# Patient Record
Sex: FEMALE | Race: BLACK OR AFRICAN AMERICAN | Employment: FULL TIME | URBAN - METROPOLITAN AREA
[De-identification: names, ages, dates, MRNs, and addresses within clinical notes are randomized per-mention and may not be internally consistent; named-entity substitution may affect disease eponyms.]

---

## 2018-11-13 ENCOUNTER — HOSPITAL ENCOUNTER (EMERGENCY)
Age: 53
Discharge: HOME | End: 2018-11-13
Attending: EMERGENCY MEDICINE
Payer: SELF-PAY

## 2018-11-13 VITALS
OXYGEN SATURATION: 99 % | WEIGHT: 158 LBS | HEART RATE: 76 BPM | RESPIRATION RATE: 16 BRPM | HEIGHT: 66 IN | BODY MASS INDEX: 25.39 KG/M2 | SYSTOLIC BLOOD PRESSURE: 148 MMHG | DIASTOLIC BLOOD PRESSURE: 84 MMHG | TEMPERATURE: 98.2 F

## 2018-11-13 DIAGNOSIS — R42 DIZZINESS: ICD-10-CM

## 2018-11-13 DIAGNOSIS — E87.8 ELECTROLYTE ABNORMALITY: Primary | ICD-10-CM

## 2018-11-13 LAB
ALBUMIN SERPL-MCNC: 2.8 G/DL (ref 3.5–5)
ALBUMIN/GLOB SERPL: 0.7 {RATIO}
ALP SERPL-CCNC: 180 U/L (ref 50–130)
ALT SERPL-CCNC: 22 U/L (ref 12–65)
ANION GAP SERPL CALC-SCNC: 10 MMOL/L
AST SERPL-CCNC: 13 U/L (ref 15–37)
BACTERIA URNS QL MICRO: 0 /HPF
BASOPHILS # BLD: 0 K/UL (ref 0–0.2)
BASOPHILS NFR BLD: 1 % (ref 0–2)
BILIRUB SERPL-MCNC: 0.4 MG/DL (ref 0.2–1.1)
BUN SERPL-MCNC: 21 MG/DL (ref 6–23)
CALCIUM SERPL-MCNC: 9 MG/DL (ref 8.3–10.4)
CASTS URNS QL MICRO: 0 /LPF
CHLORIDE SERPL-SCNC: 91 MMOL/L (ref 98–107)
CO2 SERPL-SCNC: 33 MMOL/L (ref 21–32)
CREAT SERPL-MCNC: 1.13 MG/DL (ref 0.6–1)
DIFFERENTIAL METHOD BLD: NORMAL
EOSINOPHIL # BLD: 0.1 K/UL (ref 0–0.8)
EOSINOPHIL NFR BLD: 1 % (ref 0.5–7.8)
EPI CELLS #/AREA URNS HPF: NORMAL /HPF
ERYTHROCYTE [DISTWIDTH] IN BLOOD BY AUTOMATED COUNT: 13 %
GLOBULIN SER CALC-MCNC: 4 G/DL (ref 2.3–3.5)
GLUCOSE SERPL-MCNC: 180 MG/DL (ref 65–100)
HCT VFR BLD AUTO: 40 % (ref 35.8–46.3)
HGB BLD-MCNC: 13 G/DL (ref 11.7–15.4)
IMM GRANULOCYTES # BLD: 0 K/UL (ref 0–0.5)
IMM GRANULOCYTES NFR BLD AUTO: 0 % (ref 0–5)
LYMPHOCYTES # BLD: 2.1 K/UL (ref 0.5–4.6)
LYMPHOCYTES NFR BLD: 25 % (ref 13–44)
MAGNESIUM SERPL-MCNC: 1.6 MG/DL (ref 1.8–2.4)
MCH RBC QN AUTO: 26.7 PG (ref 26.1–32.9)
MCHC RBC AUTO-ENTMCNC: 32.5 G/DL (ref 31.4–35)
MCV RBC AUTO: 82.3 FL (ref 79.6–97.8)
MONOCYTES # BLD: 0.6 K/UL (ref 0.1–1.3)
MONOCYTES NFR BLD: 7 % (ref 4–12)
NEUTS SEG # BLD: 5.7 K/UL (ref 1.7–8.2)
NEUTS SEG NFR BLD: 67 % (ref 43–78)
NRBC # BLD: 0 K/UL (ref 0–0.2)
PLATELET # BLD AUTO: 192 K/UL (ref 150–450)
PMV BLD AUTO: 10.1 FL (ref 9.4–12.3)
POTASSIUM SERPL-SCNC: 3.1 MMOL/L (ref 3.5–5.1)
PROT SERPL-MCNC: 6.8 G/DL
RBC # BLD AUTO: 4.86 M/UL (ref 4.05–5.2)
RBC #/AREA URNS HPF: NORMAL /HPF
SODIUM SERPL-SCNC: 134 MMOL/L (ref 136–145)
WBC # BLD AUTO: 8.5 K/UL (ref 4.3–11.1)
WBC URNS QL MICRO: NORMAL /HPF

## 2018-11-13 PROCEDURE — 81015 MICROSCOPIC EXAM OF URINE: CPT

## 2018-11-13 PROCEDURE — 74011250636 HC RX REV CODE- 250/636: Performed by: EMERGENCY MEDICINE

## 2018-11-13 PROCEDURE — 99284 EMERGENCY DEPT VISIT MOD MDM: CPT | Performed by: EMERGENCY MEDICINE

## 2018-11-13 PROCEDURE — 85025 COMPLETE CBC W/AUTO DIFF WBC: CPT

## 2018-11-13 PROCEDURE — 74011250637 HC RX REV CODE- 250/637: Performed by: EMERGENCY MEDICINE

## 2018-11-13 PROCEDURE — 96365 THER/PROPH/DIAG IV INF INIT: CPT | Performed by: EMERGENCY MEDICINE

## 2018-11-13 PROCEDURE — 83735 ASSAY OF MAGNESIUM: CPT

## 2018-11-13 PROCEDURE — 80053 COMPREHEN METABOLIC PANEL: CPT

## 2018-11-13 RX ORDER — POTASSIUM CHLORIDE 20 MEQ/1
40 TABLET, EXTENDED RELEASE ORAL
Status: COMPLETED | OUTPATIENT
Start: 2018-11-13 | End: 2018-11-13

## 2018-11-13 RX ORDER — MAGNESIUM SULFATE HEPTAHYDRATE 40 MG/ML
2 INJECTION, SOLUTION INTRAVENOUS
Status: COMPLETED | OUTPATIENT
Start: 2018-11-13 | End: 2018-11-13

## 2018-11-13 RX ORDER — POTASSIUM CHLORIDE 1500 MG/1
20 TABLET, FILM COATED, EXTENDED RELEASE ORAL 2 TIMES DAILY
Qty: 14 TAB | Refills: 0 | Status: SHIPPED | OUTPATIENT
Start: 2018-11-13 | End: 2018-11-20

## 2018-11-13 RX ADMIN — POTASSIUM CHLORIDE 40 MEQ: 20 TABLET, EXTENDED RELEASE ORAL at 15:27

## 2018-11-13 RX ADMIN — MAGNESIUM SULFATE HEPTAHYDRATE 2 G: 40 INJECTION, SOLUTION INTRAVENOUS at 15:27

## 2018-11-13 RX ADMIN — SODIUM CHLORIDE 1000 ML: 900 INJECTION, SOLUTION INTRAVENOUS at 14:48

## 2018-11-13 NOTE — ED TRIAGE NOTES
Pt in states she was sent by dr. Jayde Jon office and told to come to ed for low blood pressure. States her bgl was 400. States she uses insulin pump and dr Leatha Graf has made changes recently.

## 2018-11-13 NOTE — DISCHARGE INSTRUCTIONS
Electrolyte Imbalance: Care Instructions  Your Care Instructions  Electrolytes are minerals in your blood. They include sodium, potassium, calcium, and magnesium. When they are not at the right levels, you can feel very ill. You may not know what is causing it, but you know something is wrong. You may feel weak or numb, have muscle spasms, or twitch. Your heart may beat fast. Symptoms are different with each mineral. Too much is as bad as too little. Minerals help keep your body working as it should. Vomiting, diarrhea, and fever can cause you to lose minerals. A problem with your kidneys can tip a mineral out of balance. So can taking certain medicines. Your doctor may do more tests. He or she may change your medicine and diet. If you are low in one or more minerals, they may be given through a tube into your vein (IV). Your doctor may have you take or drink special fluids or pills. If your kidneys are failing, your blood may be filtered. This is called dialysis. It can put the minerals back in balance. Follow-up care is a key part of your treatment and safety. Be sure to make and go to all appointments, and call your doctor if you are having problems. It's also a good idea to know your test results and keep a list of the medicines you take. How can you care for yourself at home? · Take your medicines exactly as prescribed. Call your doctor if you have any problems with your medicines. You will get more details on the specific medicines your doctor prescribes. · Do not take any medicine without talking to your doctor first. This includes prescription, over-the-counter, and herbal medicines. · If you have kidney, heart, or liver disease and have to limit fluids, talk with your doctor before you increase the amount of fluids you drink. · Your doctor or dietitian may give you a diet plan to help balance your minerals. Follow the diet carefully. When should you call for help?   Call 911 anytime you think you may need emergency care. For example, call if:    · You passed out (lost consciousness).     · Your heartbeat seems to be irregular. It might be speeding up and then slowing down or skipping beats.    Call your doctor now or seek immediate medical care if:    · You have muscle aches.     · You feel very weak.     · You are confused or cannot think clearly.    Watch closely for changes in your health, and be sure to contact your doctor if:    · You do not get better as expected. Where can you learn more? Go to http://russell-piper.info/. Enter Y745 in the search box to learn more about \"Electrolyte Imbalance: Care Instructions. \"  Current as of: March 29, 2018  Content Version: 11.8  © 0535-1781 [employer]. Care instructions adapted under license by [employer] (which disclaims liability or warranty for this information). If you have questions about a medical condition or this instruction, always ask your healthcare professional. [place] [address] any warranty or liability for your use of this information. Dizziness: Care Instructions  Your Care Instructions  Dizziness is the feeling of unsteadiness or fuzziness in your head. It is different than having vertigo, which is a feeling that the room is spinning or that you are moving or falling. It is also different from lightheadedness, which is the feeling that you are about to faint. It can be hard to know what causes dizziness. Some people feel dizzy when they have migraine headaches. Sometimes bouts of flu can make you feel dizzy. Some medical conditions, such as heart problems or high blood pressure, can make you feel dizzy. Many medicines can cause dizziness, including medicines for high blood pressure, pain, or anxiety. If a medicine causes your symptoms, your doctor may recommend that you stop or change the medicine.  If it is a problem with your heart, you may need medicine to help your heart work better. If there is no clear reason for your symptoms, your doctor may suggest watching and waiting for a while to see if the dizziness goes away on its own. Follow-up care is a key part of your treatment and safety. Be sure to make and go to all appointments, and call your doctor if you are having problems. It's also a good idea to know your test results and keep a list of the medicines you take. How can you care for yourself at home? · If your doctor recommends or prescribes medicine, take it exactly as directed. Call your doctor if you think you are having a problem with your medicine. · Do not drive while you feel dizzy. · Try to prevent falls. Steps you can take include:  ? Using nonskid mats, adding grab bars near the tub, and using night-lights. ? Clearing your home so that walkways are free of anything you might trip on.  ? Letting family and friends know that you have been feeling dizzy. This will help them know how to help you. When should you call for help? Call 911 anytime you think you may need emergency care. For example, call if:    · You passed out (lost consciousness).     · You have dizziness along with symptoms of a heart attack. These may include:  ? Chest pain or pressure, or a strange feeling in the chest.  ? Sweating. ? Shortness of breath. ? Nausea or vomiting. ? Pain, pressure, or a strange feeling in the back, neck, jaw, or upper belly or in one or both shoulders or arms. ? Lightheadedness or sudden weakness. ? A fast or irregular heartbeat.     · You have symptoms of a stroke. These may include:  ? Sudden numbness, tingling, weakness, or loss of movement in your face, arm, or leg, especially on only one side of your body. ? Sudden vision changes. ? Sudden trouble speaking. ? Sudden confusion or trouble understanding simple statements. ? Sudden problems with walking or balance.   ? A sudden, severe headache that is different from past headaches.    Call your doctor now or seek immediate medical care if:    · You feel dizzy and have a fever, headache, or ringing in your ears.     · You have new or increased nausea and vomiting.     · Your dizziness does not go away or comes back.    Watch closely for changes in your health, and be sure to contact your doctor if:    · You do not get better as expected. Where can you learn more? Go to http://russell-piper.info/. Enter F158 in the search box to learn more about \"Dizziness: Care Instructions. \"  Current as of: November 20, 2017  Content Version: 11.8  © 5990-3643 [employer]. Care instructions adapted under license by [employer] (which disclaims liability or warranty for this information). If you have questions about a medical condition or this instruction, always ask your healthcare professional. [address] any warranty or liability for your use of this information.

## 2018-11-13 NOTE — ED NOTES
Patient states she has \"something moving through her body\". Recently lost weight due to diuretic use. She also complains of \"something in my feet\".

## 2018-11-13 NOTE — ED PROVIDER NOTES
Pt is a 44 yo female who is coming in with poorly controlled glucose. She went to pcp and they reported increase in her glucose. She has history of noncompliance with her insulin. She does admit she took off her v-go 30 2 days ago as she felt dizzy. I asked her what her sugar was at that time and she said in the 300's. She just put the device back on this morning. She also has had some diarrhea. She also complains that something is in her feet and moving. She also has some low BP as well it got down to 88. She currently has no complaint. The history is provided by the patient. Past Medical History:  
Diagnosis Date  Diabetes ([facility] [address] No past surgical history on file. No family history on file. Social History Socioeconomic History  Marital status: SINGLE Spouse name: Not on file  Number of children: Not on file  Years of education: Not on file  Highest education level: Not on file Social Needs  Financial resource strain: Not on file  Food insecurity - worry: Not on file  Food insecurity - inability: Not on file  Transportation needs - medical: Not on file  Transportation needs - non-medical: Not on file Occupational History  Not on file Tobacco Use  Smoking status: Current Every Day Smoker Packs/day: 1.00 Substance and Sexual Activity  Alcohol use: Yes Comment: occ  Drug use: No  
 Sexual activity: Not on file Other Topics Concern  Not on file Social History Narrative  Not on file ALLERGIES: Patient has no known allergies. Review of Systems Constitutional: Positive for chills. Negative for fever. Respiratory: Negative for chest tightness, shortness of breath and wheezing. Cardiovascular: Negative for chest pain and palpitations. Gastrointestinal: Positive for diarrhea and nausea. Negative for vomiting. Skin: Negative. Negative for rash and wound. All other systems reviewed and are negative. Vitals:  
 11/13/18 1338 BP: 120/82 Pulse: 78 Resp: 17 Temp: 98 °F (36.7 °C) SpO2: 100% Weight: 71.7 kg (158 lb) Height: 5' 6\" (1.676 m) Physical Exam  
Constitutional: She is oriented to person, place, and time. She appears well-developed and well-nourished. No distress. HENT:  
Head: Normocephalic and atraumatic. Eyes: Conjunctivae are normal. No scleral icterus. Neck: Normal range of motion. Cardiovascular: Normal rate, regular rhythm and normal heart sounds. Exam reveals no gallop and no friction rub. No murmur heard. Pulmonary/Chest: Effort normal. No stridor. No respiratory distress. She has no wheezes. She has no rales. Abdominal: Soft. She exhibits no distension and no mass. There is no tenderness. There is no guarding. Neurological: She is alert and oriented to person, place, and time. No cranial nerve deficit. Skin: Skin is warm. No rash noted. No erythema. Psychiatric: She has a normal mood and affect. Her behavior is normal.  
Nursing note and vitals reviewed. MDM Number of Diagnoses or Management Options Diagnosis management comments: Patient pressures are running much higher with IV fluids. She currently has a relatively normal sugar at 180. We had long discussion about her compliance with device. She has taken off several times due to concerns that it is causing her dizziness. But I think it may be more electrolyte imbalance and have placed these. She is comfortable trying the V-GO device a little longer and throat are only to take it off if her sugar was low otherwise she needs to wear it all the time as instructed by her PCP. Amount and/or Complexity of Data Reviewed Clinical lab tests: ordered and reviewed (Results for orders placed or performed during the hospital encounter of 11/13/18 
-CBC WITH AUTOMATED DIFF Result                      Value             Ref Range WBC                         8.5               4.3 - 11.1 K* 
     RBC                         4.86              4.05 - 5.2 M* HGB                         13.0              11.7 - 15.4 * HCT                         40.0              35.8 - 46.3 % MCV                         82.3              79.6 - 97.8 * MCH                         26.7              26.1 - 32.9 * MCHC                        32.5              31.4 - 35.0 * RDW                         13.0              % PLATELET                    192               150 - 450 K/* MPV                         10.1              9.4 - 12.3 FL ABSOLUTE NRBC               0.00              0.0 - 0.2 K/* DF                          AUTOMATED NEUTROPHILS                 67                43 - 78 % LYMPHOCYTES                 25                13 - 44 % MONOCYTES                   7                 4.0 - 12.0 % EOSINOPHILS                 1                 0.5 - 7.8 % BASOPHILS                   1                 0.0 - 2.0 % IMMATURE GRANULOCYTES       0                 0.0 - 5.0 %   
     ABS. NEUTROPHILS            5.7               1.7 - 8.2 K/* ABS. LYMPHOCYTES            2.1               0.5 - 4.6 K/* ABS. MONOCYTES              0.6               0.1 - 1.3 K/* ABS. EOSINOPHILS            0.1               0.0 - 0.8 K/* ABS. BASOPHILS              0.0               0.0 - 0.2 K/* ABS. IMM. GRANS.            0.0               0.0 - 0.5 K/* 
-METABOLIC PANEL, COMPREHENSIVE Result                      Value             Ref Range Sodium                      134 (L)           136 - 145 mm* Potassium                   3.1 (L)           3.5 - 5.1 mm* Chloride                    91 (L)            98 - 107 mmo*      CO2                         33 (H)            21 - 32 mmol* 
 Anion gap                   10                mmol/L Glucose                     180 (H)           65 - 100 mg/* BUN                         21                6 - 23 MG/DL Creatinine                  1.13 (H)          0.6 - 1.0 MG* 
     GFR est AA                  >60               >60 ml/min/1* GFR est non-AA              53                ml/min/1.73m2 Calcium                     9.0               8.3 - 10.4 M* Bilirubin, total            0.4               0.2 - 1.1 MG* ALT (SGPT)                  22                12 - 65 U/L   
     AST (SGOT)                  13 (L)            15 - 37 U/L Alk. phosphatase            180 (H)           50 - 130 U/L Protein, total              6.8               g/dL Albumin                     2.8 (L)           3.5 - 5.0 g/* Globulin                    4.0 (H)           2.3 - 3.5 g/* A-G Ratio                   0.7 -MAGNESIUM Result                      Value             Ref Range Magnesium                   1.6 (L)           1.8 - 2.4 mg* ) Independent visualization of images, tracings, or specimens: yes (Normal sinus rhythm, narrow QRS complex, no bundle branch blocks, and no ST segment elevation ) Procedures

## 2018-11-13 NOTE — ED NOTES
I have reviewed discharge instructions with the patient and spouse. The patient and spouse verbalized understanding. Patient left ED via Discharge Method: ambulatory to Home with her . Opportunity for questions and clarification provided. Patient given 1 scripts. To continue your aftercare when you leave the hospital, you may receive an automated call from our care team to check in on how you are doing. This is a free service and part of our promise to provide the best care and service to meet your aftercare needs.  If you have questions, or wish to unsubscribe from this service please call [phone]. Thank you for Choosing our [facility] Emergency Department.

## 2018-11-15 ENCOUNTER — HOSPITAL ENCOUNTER (OUTPATIENT)
Dept: PHYSICAL THERAPY | Age: 53
Discharge: HOME | End: 2018-11-15
Payer: COMMERCIAL

## 2018-11-15 PROCEDURE — 97162 PT EVAL MOD COMPLEX 30 MIN: CPT

## 2018-11-15 NOTE — THERAPY EVALUATION
Paolo Merline : 1965 Primary: [facility] Secondary:  [facility] [place] [id number] [facility], [address], [place] [address]. [phone]   [phone] OUTPATIENT PHYSICAL THERAPY:Initial Assessment 11/15/2018 ICD-10: Treatment Diagnosis: Other abnormalities of gait and mobility R26.89; muscle weakness (generalized) Precautions/Allergies:  
Patient has no known allergies. MD Orders: Impaired gait MEDICAL/REFERRING DIAGNOSIS: 
Unspecified abnormalities of gait and mobility [R26.9] DATE OF ONSET:  2 months. REFERRING PHYSICIAN: Yossi Garcia MD 
RETURN PHYSICIAN APPOINTMENT: 18 INITIAL ASSESSMENT:  Ms. Ftaoumata Jimenez presents with LE weakness, imbalance, unsteady gait. Patient is at risk for falls. . Patient would benefit from PT to address these problems to improve patient's independence and safety with mobility and daily activities. Thank you. PROBLEM LIST (Impacting functional limitations): 1. Decreased Strength 2. Decreased Ambulation Ability/Technique 3. Decreased Balance 4. Decreased Activity Tolerance 5. Decreased [place] with Home Exercise Program INTERVENTIONS PLANNED: 
1. Balance Exercise 2. Gait Training 3. Home Exercise Program (HEP) 4. Neuromuscular Re-education/Strengthening 5. Therapeutic Exercise/Strengthening TREATMENT PLAN: 
Effective Dates: 11/15/2018 TO 2019 (90 days). Frequency/Duration: 2 times a week for 60- 90 Day(s) GOALS: (Goals have been discussed and agreed upon with patient.) Short-Term Functional Goals: Time Frame: 2-4 weeks 1. Patient will demonstrate independence and compliance with home exercise program to improve balance and strength for daily activities. 2. Patient will increase her score on the Thomas Balance Scale to greater than or equal to 36/56 indicating improved safety and decreased fall risk for daily activities.    
3.  
 Discharge Goals: Time Frame: 8-12 weeks 1. Patient will increase bilateral lower extremity strength to greater than or equal to 4/5 to improve strength for functional mobility activities. 2. Patient will increase her score on the Thomas Balance Scale to greater than or equal to 48/56 indicating improved safety and decreased fall risk for daily activities. 3. Patient will ambulate with least assistive device over level and unlevel surfaces without evidence of imbalance to improve safety for daily activities. 4. Patient will demonstrate improved time on Five Time Sit to Stand Test to less than or equal to 21 seconds indicating improved LE strength for daily mobility. 5.  
Rehabilitation Potential For Stated Goals: Good Regarding Shameka Villafuerte's therapy, I certify that the treatment plan above will be carried out by a therapist or under their direction. Thank you for this referral, 
Piper Boyd PT Referring Physician Signature: Desi Moreno MD            Date The information in this section was collected on 11/15/18 (except where otherwise noted). HISTORY:  
History of Present Injury/Illness (Reason for Referral): Had problems with blood sugar and diabetes and legs got swollen for about a month and since then balance is off. Using cane since then. Have fallen. Dizzy at times. MD not sure what's going on yet. Past Medical History/Comorbidities: Ms. Jennifer Gar  has a past medical history of Diabetes ([facility] [place] [address] Ms. Jennifer Gar  has no past surgical history on file. Social History/Living Environment:  
  steps to enter home with rail. One level with basement. Prior Level of Function/Work/Activity: 
Works for [employer], out of work since this started. Dominant Side:  
      RIGHT Previous Treatment Approaches:   
      none Personal Factors:   
      Sex:  female Age:  45 y.o. Ambulatory/Rehab Services H2 Model Falls Risk Assessment Risk Factors: (1)  Dizziness/Vertigo 
     (5)  History of Recent Falls [w/in 3 months] Ability to Rise from Chair: 
     (1)  Pushes up, successful in one attempt Falls Prevention Plan: Mobility Assistance Device (specify):  cane Total: (5 or greater = High Risk): 7  
 ©2010 [facility] [employer] . [facility] States Patent #9,662,707. Federal Law prohibits the replication, distribution or use without written permission from [facility] [employer] Current Medications:   
  
Current Outpatient Medications:  
  potassium chloride SR (K-TAB) 20 mEq tablet, Take 1 Tab by mouth two (2) times a day for 7 days. , Disp: 14 Tab, Rfl: 0 
  HYDROcodone-acetaminophen (NORCO) 5-325 mg per tablet, Take 1 Tab by mouth every four (4) hours as needed for Pain., Disp: 6 Tab, Rfl: 0 
  HYDROcodone-acetaminophen (NORCO) 7.5-325 mg per tablet, Take 1 Tab by mouth every six (6) hours as needed for Pain., Disp: 14 Tab, Rfl: 0 
  amoxicillin-clavulanate (AUGMENTIN) 875-125 mg per tablet, Take 1 Tab by mouth two (2) times a day., Disp: 20 Tab, Rfl: 0 
  ibuprofen (MOTRIN IB) 200 mg tablet, Take  by mouth., Disp: , Rfl:  
  GLIPIZIDE PO, Take  by mouth.  , Disp: , Rfl:   METFORMIN HCL (METFORMIN PO), Take  by mouth.  , Disp: , Rfl:  
  acyclovir (ZOVIRAX) 400 mg tablet, Take 400 mg by mouth two (2) times a day.  , Disp: , Rfl:   
Date Last Reviewed:  11/15/18 Number of Personal Factors/Comorbidities that affect the Plan of Care: 1-2: MODERATE COMPLEXITY EXAMINATION:  
Observation/Orthostatic Postural Assessment:   
      WNL Strength:   
      LE STRENGTH:  3+/5 hip flexion, 4-/5 hip abduction, 4-/5 hip adduction, 4-/5 hip extension, 4-/5 knee extension, 4-/5 knee flexion, 4-/5 ankle dorsiflexion, 4-/5 ankle plantarflexion, 4-/5 ankle inversion, 4-/5 ankle eversion. Functional Mobility:  
      Gait/Ambulation:  Patient ambulates with cane with normal gait pattern, but wider MERA, and path deviations. Transfers:  With UE assist 
      Bed Mobility:  independent Stairs:  With rail and assist 
Sensation: Altered sensation in feet legs (\"ball on bottom of feet and tight on calves L> R\") Postural Control & Balance:· Thomas Balance Scale:  33/56.   (A score less than 45/56 indicates high risk of falls) · Dynamic Gait Index:  NT/24.   (A score less than or equal to19/24 is abnormal and predictive of falls) Body Structures Involved: 1. Nerves 2. Eyes and Ears 3. Muscles Body Functions Affected: 1. Sensory/Pain 2. Neuromusculoskeletal 
3. Movement Related Activities and Participation Affected: 1. General Tasks and Demands 2. Mobility 3. Domestic Life 4. Community, Social and [place] Number of elements (examined above) that affect the Plan of Care: 3: MODERATE COMPLEXITY CLINICAL PRESENTATION:  
Presentation: Evolving clinical presentation with changing clinical characteristics: MODERATE COMPLEXITY CLINICAL DECISION MAKING:  
Outcome Measure: Tool Used: Janis Enrique Balance Scale Score:  Initial: 33/56 Most Recent: X/56 (Date: -- ) Interpretation of Score: Each section is scored on a 0-4 scale, 0 representing the patients inability to perform the task and 4 representing independence. The scores of each section are added together for a total score of 56. The higher the patients score, the more independent the patient is. Any score below 45 indicates increased risk for falls. Score 56 55-45 44-34 33-23 22-12 11-1 0 Modifier CH CI CJ CK CL CM CN Tool Used: Five Times Sit to Stand (FTSST or 5xSST) Score:  Initial: 34 seconds Most Recent:  seconds (Date: -- ) Interpretation of Score: This is a measure of functional lower limb muscle strength and quantifying functional change of transitional movements.   A score of 12 seconds or less indicates a normal level of function for community dwelling older individuals, a score of 15 seconds or more is at risk for fall. Score 12 or less 13-14 15-16 17-18 19-20 20-21 22 or greater Modifier CH CI CJ CK CL CM CN Medical Necessity:  
· Patient is expected to demonstrate progress in strength, balance and functional technique to increase independence with daily activities. Reason for Services/Other Comments: 
· Patient continues to demonstrate capacity to improve strength, balance, gait which will increase independence and increase safety. Use of outcome tool(s) and clinical judgement create a POC that gives a: Questionable prediction of patient's progress: MODERATE COMPLEXITY  
  
 
 
 
TREATMENT:  
(In addition to Assessment/Re-Assessment sessions the following treatments were rendered) Pre-treatment Symptoms/Complaints:  Weak, off balance Pain: Initial:  
  0 Post Session:  0  
 
THERAPEUTIC EXERCISE: ( minutes):  Exercises per grid below to improve mobility, strength and balance. Required minimal verbal cues to promote proper body alignment, promote proper body posture and promote proper body mechanics. Progressed resistance, repetitions and complexity of movement as indicated. NEUROMUSCULAR RE-EDUCATION: ( minutes):  Exercise/activities per grid below to improve balance, kinesthetic sense, posture and proprioception. Required minimal verbal cues to promote static and dynamic balance in standing. [employer] 
Treatment/Session Assessment:   
· Response to Treatment:  Patient tolerated session well. Patient has standing LE exercises for HEP. Pierre Butler · Compliance with Program/Exercises: Will assess as treatment progresses. · Recommendations/Intent for next treatment session: \"Next visit will focus on advancements to more challenging activities\". Total Treatment Duration: PT Patient Time In/Time Out Time In: 1113 Time Out: 1200 Sofía Saba, PT No future appointments.

## 2018-11-28 ENCOUNTER — HOSPITAL ENCOUNTER (OUTPATIENT)
Dept: PHYSICAL THERAPY | Age: 53
Discharge: HOME | End: 2018-11-28
Payer: COMMERCIAL

## 2018-11-28 NOTE — PROGRESS NOTES
PT NOTE: Patient cancelled her appointment with PT this afternoon. She stated she was leaving an MD appointment and they were admitting her to the hospital.  Patient said she would call and follow up with PT about future appointments.   
Antonio Mcardle, PT

## 2018-11-30 ENCOUNTER — HOSPITAL ENCOUNTER (INPATIENT)
Age: 53
LOS: 3 days | Discharge: HOME | DRG: 312 | End: 2018-12-06
Attending: STUDENT IN AN ORGANIZED HEALTH CARE EDUCATION/TRAINING PROGRAM | Admitting: HOSPITALIST
Payer: COMMERCIAL

## 2018-11-30 DIAGNOSIS — N39.0 URINARY TRACT INFECTION WITH HEMATURIA, SITE UNSPECIFIED: ICD-10-CM

## 2018-11-30 DIAGNOSIS — R31.9 URINARY TRACT INFECTION WITH HEMATURIA, SITE UNSPECIFIED: ICD-10-CM

## 2018-11-30 DIAGNOSIS — R55 SYNCOPE AND COLLAPSE: ICD-10-CM

## 2018-11-30 DIAGNOSIS — R73.9 HYPERGLYCEMIA: Primary | ICD-10-CM

## 2018-11-30 PROCEDURE — 93005 ELECTROCARDIOGRAM TRACING: CPT | Performed by: EMERGENCY MEDICINE

## 2018-11-30 PROCEDURE — 87086 URINE CULTURE/COLONY COUNT: CPT

## 2018-11-30 PROCEDURE — 85025 COMPLETE CBC W/AUTO DIFF WBC: CPT

## 2018-11-30 PROCEDURE — 99285 EMERGENCY DEPT VISIT HI MDM: CPT | Performed by: STUDENT IN AN ORGANIZED HEALTH CARE EDUCATION/TRAINING PROGRAM

## 2018-11-30 PROCEDURE — 87186 SC STD MICRODIL/AGAR DIL: CPT

## 2018-11-30 PROCEDURE — 87088 URINE BACTERIA CULTURE: CPT

## 2018-11-30 PROCEDURE — 81015 MICROSCOPIC EXAM OF URINE: CPT

## 2018-11-30 PROCEDURE — 80053 COMPREHEN METABOLIC PANEL: CPT

## 2018-11-30 RX ORDER — LISINOPRIL 20 MG/1
20 TABLET ORAL DAILY
COMMUNITY
End: 2018-12-06

## 2018-12-01 ENCOUNTER — APPOINTMENT (OUTPATIENT)
Dept: GENERAL RADIOLOGY | Age: 53
DRG: 312 | End: 2018-12-01
Attending: STUDENT IN AN ORGANIZED HEALTH CARE EDUCATION/TRAINING PROGRAM
Payer: COMMERCIAL

## 2018-12-01 ENCOUNTER — APPOINTMENT (OUTPATIENT)
Dept: CT IMAGING | Age: 53
DRG: 312 | End: 2018-12-01
Attending: STUDENT IN AN ORGANIZED HEALTH CARE EDUCATION/TRAINING PROGRAM
Payer: COMMERCIAL

## 2018-12-01 PROBLEM — E11.65 UNCONTROLLED TYPE 2 DIABETES MELLITUS WITH HYPERGLYCEMIA (HCC): Chronic | Status: ACTIVE | Noted: 2018-12-01

## 2018-12-01 PROBLEM — I95.1 ORTHOSTATIC HYPOTENSION: Status: ACTIVE | Noted: 2018-12-01

## 2018-12-01 LAB
ALBUMIN SERPL-MCNC: 3.1 G/DL (ref 3.5–5)
ALBUMIN/GLOB SERPL: 0.8 {RATIO}
ALP SERPL-CCNC: 181 U/L (ref 50–136)
ALT SERPL-CCNC: 17 U/L (ref 12–65)
ANION GAP SERPL CALC-SCNC: 9 MMOL/L
AST SERPL-CCNC: 11 U/L (ref 15–37)
ATRIAL RATE: 89 BPM
BACTERIA URNS QL MICRO: ABNORMAL /HPF
BASOPHILS # BLD: 0 K/UL (ref 0–0.2)
BASOPHILS NFR BLD: 0 % (ref 0–2)
BILIRUB SERPL-MCNC: 0.5 MG/DL (ref 0.2–1.1)
BUN SERPL-MCNC: 20 MG/DL (ref 6–23)
CALCIUM SERPL-MCNC: 9 MG/DL (ref 8.3–10.4)
CALCULATED P AXIS, ECG09: 72 DEGREES
CALCULATED R AXIS, ECG10: 65 DEGREES
CALCULATED T AXIS, ECG11: 76 DEGREES
CASTS URNS QL MICRO: 0 /LPF
CHLORIDE SERPL-SCNC: 87 MMOL/L (ref 98–107)
CO2 SERPL-SCNC: 31 MMOL/L (ref 21–32)
CREAT SERPL-MCNC: 1.38 MG/DL (ref 0.6–1)
CRYSTALS URNS QL MICRO: 0 /LPF
DIAGNOSIS, 93000: NORMAL
DIFFERENTIAL METHOD BLD: ABNORMAL
EOSINOPHIL # BLD: 0 K/UL (ref 0–0.8)
EOSINOPHIL NFR BLD: 0 % (ref 0.5–7.8)
EPI CELLS #/AREA URNS HPF: ABNORMAL /HPF
ERYTHROCYTE [DISTWIDTH] IN BLOOD BY AUTOMATED COUNT: 12.9 % (ref 11.9–14.6)
EST. AVERAGE GLUCOSE BLD GHB EST-MCNC: 355 MG/DL
GLOBULIN SER CALC-MCNC: 3.9 G/DL (ref 2.3–3.5)
GLUCOSE BLD STRIP.AUTO-MCNC: 218 MG/DL (ref 65–100)
GLUCOSE BLD STRIP.AUTO-MCNC: 262 MG/DL (ref 65–100)
GLUCOSE BLD STRIP.AUTO-MCNC: 286 MG/DL (ref 65–100)
GLUCOSE BLD STRIP.AUTO-MCNC: 313 MG/DL (ref 65–100)
GLUCOSE BLD STRIP.AUTO-MCNC: 350 MG/DL (ref 65–100)
GLUCOSE BLD STRIP.AUTO-MCNC: 357 MG/DL (ref 65–100)
GLUCOSE SERPL-MCNC: 539 MG/DL (ref 65–100)
HBA1C MFR BLD: 14 %
HCT VFR BLD AUTO: 37.6 % (ref 35.8–46.3)
HGB BLD-MCNC: 12.5 G/DL (ref 11.7–15.4)
IMM GRANULOCYTES # BLD: 0 K/UL (ref 0–0.5)
IMM GRANULOCYTES NFR BLD AUTO: 0 % (ref 0–5)
LACTATE BLD-SCNC: 1.63 MMOL/L (ref 0.5–1.9)
LYMPHOCYTES # BLD: 1.4 K/UL (ref 0.5–4.6)
LYMPHOCYTES NFR BLD: 14 % (ref 13–44)
MCH RBC QN AUTO: 26.8 PG (ref 26.1–32.9)
MCHC RBC AUTO-ENTMCNC: 33.2 G/DL (ref 31.4–35)
MCV RBC AUTO: 80.7 FL (ref 79.6–97.8)
MONOCYTES # BLD: 0.7 K/UL (ref 0.1–1.3)
MONOCYTES NFR BLD: 6 % (ref 4–12)
MUCOUS THREADS URNS QL MICRO: 0 /LPF
NEUTS SEG # BLD: 8.4 K/UL (ref 1.7–8.2)
NEUTS SEG NFR BLD: 79 % (ref 43–78)
NRBC # BLD: 0 K/UL (ref 0–0.2)
P-R INTERVAL, ECG05: 146 MS
PLATELET # BLD AUTO: 189 K/UL (ref 150–450)
PMV BLD AUTO: 11.3 FL (ref 9.4–12.3)
POTASSIUM SERPL-SCNC: 3.4 MMOL/L (ref 3.5–5.1)
PROT SERPL-MCNC: 7 G/DL
Q-T INTERVAL, ECG07: 376 MS
QRS DURATION, ECG06: 76 MS
QTC CALCULATION (BEZET), ECG08: 457 MS
RBC # BLD AUTO: 4.66 M/UL (ref 4.05–5.2)
RBC #/AREA URNS HPF: ABNORMAL /HPF
SODIUM SERPL-SCNC: 127 MMOL/L (ref 136–145)
VENTRICULAR RATE, ECG03: 89 BPM
WBC # BLD AUTO: 10.6 K/UL (ref 4.3–11.1)
WBC URNS QL MICRO: >100 /HPF

## 2018-12-01 PROCEDURE — 96375 TX/PRO/DX INJ NEW DRUG ADDON: CPT | Performed by: STUDENT IN AN ORGANIZED HEALTH CARE EDUCATION/TRAINING PROGRAM

## 2018-12-01 PROCEDURE — 74011636637 HC RX REV CODE- 636/637: Performed by: FAMILY MEDICINE

## 2018-12-01 PROCEDURE — 74011636637 HC RX REV CODE- 636/637: Performed by: INTERNAL MEDICINE

## 2018-12-01 PROCEDURE — 74011636637 HC RX REV CODE- 636/637: Performed by: STUDENT IN AN ORGANIZED HEALTH CARE EDUCATION/TRAINING PROGRAM

## 2018-12-01 PROCEDURE — 77030020263 HC SOL INJ SOD CL0.9% LFCR 1000ML

## 2018-12-01 PROCEDURE — 99218 HC RM OBSERVATION: CPT

## 2018-12-01 PROCEDURE — 36415 COLL VENOUS BLD VENIPUNCTURE: CPT

## 2018-12-01 PROCEDURE — 74011250636 HC RX REV CODE- 250/636: Performed by: FAMILY MEDICINE

## 2018-12-01 PROCEDURE — 96365 THER/PROPH/DIAG IV INF INIT: CPT | Performed by: STUDENT IN AN ORGANIZED HEALTH CARE EDUCATION/TRAINING PROGRAM

## 2018-12-01 PROCEDURE — 71046 X-RAY EXAM CHEST 2 VIEWS: CPT

## 2018-12-01 PROCEDURE — 74011250636 HC RX REV CODE- 250/636: Performed by: INTERNAL MEDICINE

## 2018-12-01 PROCEDURE — 83605 ASSAY OF LACTIC ACID: CPT

## 2018-12-01 PROCEDURE — 74011000258 HC RX REV CODE- 258: Performed by: STUDENT IN AN ORGANIZED HEALTH CARE EDUCATION/TRAINING PROGRAM

## 2018-12-01 PROCEDURE — 74011250637 HC RX REV CODE- 250/637: Performed by: FAMILY MEDICINE

## 2018-12-01 PROCEDURE — 82962 GLUCOSE BLOOD TEST: CPT

## 2018-12-01 PROCEDURE — 74011250636 HC RX REV CODE- 250/636: Performed by: STUDENT IN AN ORGANIZED HEALTH CARE EDUCATION/TRAINING PROGRAM

## 2018-12-01 PROCEDURE — 83036 HEMOGLOBIN GLYCOSYLATED A1C: CPT

## 2018-12-01 PROCEDURE — 70450 CT HEAD/BRAIN W/O DYE: CPT

## 2018-12-01 RX ORDER — NALOXONE HYDROCHLORIDE 0.4 MG/ML
0.4 INJECTION, SOLUTION INTRAMUSCULAR; INTRAVENOUS; SUBCUTANEOUS AS NEEDED
Status: DISCONTINUED | OUTPATIENT
Start: 2018-12-01 | End: 2018-12-06 | Stop reason: HOSPADM

## 2018-12-01 RX ORDER — ONDANSETRON 2 MG/ML
4 INJECTION INTRAMUSCULAR; INTRAVENOUS
Status: DISCONTINUED | OUTPATIENT
Start: 2018-12-01 | End: 2018-12-06 | Stop reason: HOSPADM

## 2018-12-01 RX ORDER — GLIPIZIDE 5 MG/1
5 TABLET ORAL 2 TIMES DAILY
Qty: 60 TAB | Refills: 1 | Status: SHIPPED | OUTPATIENT
Start: 2018-12-01 | End: 2018-12-06 | Stop reason: SDUPTHER

## 2018-12-01 RX ORDER — CEPHALEXIN 500 MG/1
500 CAPSULE ORAL 4 TIMES DAILY
Qty: 28 CAP | Refills: 0 | Status: SHIPPED | OUTPATIENT
Start: 2018-12-01 | End: 2018-12-06

## 2018-12-01 RX ORDER — DEXTROSE 40 %
15 GEL (GRAM) ORAL AS NEEDED
Status: DISCONTINUED | OUTPATIENT
Start: 2018-12-01 | End: 2018-12-06 | Stop reason: HOSPADM

## 2018-12-01 RX ORDER — SODIUM CHLORIDE 0.9 % (FLUSH) 0.9 %
5-10 SYRINGE (ML) INJECTION AS NEEDED
Status: DISCONTINUED | OUTPATIENT
Start: 2018-12-01 | End: 2018-12-06 | Stop reason: HOSPADM

## 2018-12-01 RX ORDER — HYDROCODONE BITARTRATE AND ACETAMINOPHEN 10; 325 MG/1; MG/1
1 TABLET ORAL
Status: DISCONTINUED | OUTPATIENT
Start: 2018-12-01 | End: 2018-12-06 | Stop reason: HOSPADM

## 2018-12-01 RX ORDER — BISACODYL 5 MG
5 TABLET, DELAYED RELEASE (ENTERIC COATED) ORAL DAILY PRN
Status: DISCONTINUED | OUTPATIENT
Start: 2018-12-01 | End: 2018-12-06 | Stop reason: HOSPADM

## 2018-12-01 RX ORDER — INSULIN GLARGINE 100 [IU]/ML
20 INJECTION, SOLUTION SUBCUTANEOUS
Status: DISCONTINUED | OUTPATIENT
Start: 2018-12-01 | End: 2018-12-02

## 2018-12-01 RX ORDER — DIPHENHYDRAMINE HCL 25 MG
25 CAPSULE ORAL
Status: DISCONTINUED | OUTPATIENT
Start: 2018-12-01 | End: 2018-12-06 | Stop reason: HOSPADM

## 2018-12-01 RX ORDER — LORAZEPAM 1 MG/1
1 TABLET ORAL
Status: DISCONTINUED | OUTPATIENT
Start: 2018-12-01 | End: 2018-12-06 | Stop reason: HOSPADM

## 2018-12-01 RX ORDER — ENOXAPARIN SODIUM 100 MG/ML
40 INJECTION SUBCUTANEOUS EVERY 24 HOURS
Status: DISCONTINUED | OUTPATIENT
Start: 2018-12-01 | End: 2018-12-06 | Stop reason: HOSPADM

## 2018-12-01 RX ORDER — ACETAMINOPHEN 325 MG/1
650 TABLET ORAL
Status: DISCONTINUED | OUTPATIENT
Start: 2018-12-01 | End: 2018-12-06 | Stop reason: HOSPADM

## 2018-12-01 RX ORDER — SODIUM CHLORIDE 9 MG/ML
1000 INJECTION, SOLUTION INTRAVENOUS CONTINUOUS
Status: DISPENSED | OUTPATIENT
Start: 2018-12-01 | End: 2018-12-01

## 2018-12-01 RX ORDER — SODIUM CHLORIDE 9 MG/ML
1000 INJECTION, SOLUTION INTRAVENOUS CONTINUOUS
Status: DISCONTINUED | OUTPATIENT
Start: 2018-12-01 | End: 2018-12-02 | Stop reason: ALTCHOICE

## 2018-12-01 RX ORDER — GLIPIZIDE 5 MG/1
5 TABLET ORAL
Status: DISCONTINUED | OUTPATIENT
Start: 2018-12-01 | End: 2018-12-03

## 2018-12-01 RX ORDER — SODIUM CHLORIDE 0.9 % (FLUSH) 0.9 %
5-10 SYRINGE (ML) INJECTION EVERY 8 HOURS
Status: DISCONTINUED | OUTPATIENT
Start: 2018-12-01 | End: 2018-12-06 | Stop reason: HOSPADM

## 2018-12-01 RX ORDER — DEXTROSE 50 % IN WATER (D50W) INTRAVENOUS SYRINGE
25-50 AS NEEDED
Status: DISCONTINUED | OUTPATIENT
Start: 2018-12-01 | End: 2018-12-06 | Stop reason: HOSPADM

## 2018-12-01 RX ADMIN — GLIPIZIDE 5 MG: 5 TABLET ORAL at 16:14

## 2018-12-01 RX ADMIN — INSULIN HUMAN 10 UNITS: 100 INJECTION, SOLUTION PARENTERAL at 00:25

## 2018-12-01 RX ADMIN — CEFTRIAXONE 1 G: 1 INJECTION, POWDER, FOR SOLUTION INTRAMUSCULAR; INTRAVENOUS at 00:35

## 2018-12-01 RX ADMIN — SODIUM CHLORIDE 1000 ML: 900 INJECTION, SOLUTION INTRAVENOUS at 06:34

## 2018-12-01 RX ADMIN — INSULIN HUMAN 10 UNITS: 100 INJECTION, SOLUTION PARENTERAL at 22:11

## 2018-12-01 RX ADMIN — ENOXAPARIN SODIUM 40 MG: 40 INJECTION, SOLUTION INTRAVENOUS; SUBCUTANEOUS at 09:13

## 2018-12-01 RX ADMIN — SODIUM CHLORIDE 1000 ML: 900 INJECTION, SOLUTION INTRAVENOUS at 22:03

## 2018-12-01 RX ADMIN — INSULIN HUMAN 8 UNITS: 100 INJECTION, SOLUTION PARENTERAL at 12:12

## 2018-12-01 RX ADMIN — SODIUM CHLORIDE 1000 ML: 900 INJECTION, SOLUTION INTRAVENOUS at 03:33

## 2018-12-01 RX ADMIN — INSULIN GLARGINE 20 UNITS: 100 INJECTION, SOLUTION SUBCUTANEOUS at 22:00

## 2018-12-01 RX ADMIN — INSULIN HUMAN 6 UNITS: 100 INJECTION, SOLUTION PARENTERAL at 09:12

## 2018-12-01 RX ADMIN — GLIPIZIDE 5 MG: 5 TABLET ORAL at 09:13

## 2018-12-01 RX ADMIN — SODIUM CHLORIDE 1000 ML: 900 INJECTION, SOLUTION INTRAVENOUS at 00:25

## 2018-12-01 NOTE — PROGRESS NOTES
Condition remains stable given insulin coverage for an elevated glucose level twice, no acute distress, alert, oriented  X 4.

## 2018-12-01 NOTE — ED NOTES
While checking orthostatic vitals on pt, pt very adamant about md finding a way to keep her as \"I'm not well and can't go home, I'll die there. \" \"I need to stay and get well\". Notified. Md. Pt now on commode.

## 2018-12-01 NOTE — PROGRESS NOTES
Pt admitted to [facility] 3rd floor. Admission database completed. Admission booklet given and reviewed with patient. Pt oriented to room and bed controls. Pt instructed to use call light for any needs, pt voiced understanding.

## 2018-12-01 NOTE — ED NOTES
TRANSFER - OUT REPORT: 
 
Verbal report given to Devin Aldridge RN on Tru Daniel  being transferred to [phone] for routine progression of care Report consisted of patients Situation, Background, Assessment and  
Recommendations(SBAR). Information from the following report(s) ED Summary was reviewed with the receiving nurse. Lines:  
Peripheral IV 11/30/18 Right Antecubital (Active) Site Assessment Clean, dry, & intact 11/30/2018 11:36 PM  
Phlebitis Assessment 0 11/30/2018 11:36 PM  
Infiltration Assessment 0 11/30/2018 11:36 PM  
Dressing Status Clean, dry, & intact 11/30/2018 11:36 PM  
Dressing Type Transparent 11/30/2018 11:36 PM  
Hub Color/Line Status Pink 11/30/2018 11:36 PM  
Action Taken Blood drawn 11/30/2018 11:36 PM  
  
 
Opportunity for questions and clarification was provided. Patient transported with: 
 Registered Nurse

## 2018-12-01 NOTE — ED TRIAGE NOTES
Pt complains of weakness, dizziness, and low blood pressure when standing \"for weeks\". Pt reports passing out and falling, hitting her head on the bathtub. Pt denies taking blood thinners. Complains of pain to the left side of her body. Reports she has seen her PCP for this but [facility] sent me here. He won't even renew my diabetes prescriptions. \"

## 2018-12-01 NOTE — PROGRESS NOTES
The patient is a 46 y/o F with HTN, DM 2, was admitted early this morning for syncope likely due to orthostatic hypotension, hyperglycemia in the context of uncontrolled DM type 2 and asymptomatic bacteriuria. HbA1c came back at 14.0. She was started on Glipizide, and Insulin Glargine was added. She is also on sliding scale insulin. She will definitely need Diabetic education. Will continue IV fluids.

## 2018-12-01 NOTE — H&P
HOSPITALIST H&PNAME:  Daisy Rivera Age:  45 y.o. 
:   1965 MRN:   [id number] PCP: Dereck Ocampo MD 
Treatment Team: Attending Provider: Brooke Child; Primary Nurse: Margarita Rivera RN No Order CC: Reason for admission is: orthostatic hypotension HPI:  
Patient history was obtained from the ER provider prior to seeing the patient. Patient is a 45 y.o. female who presents to the ER due to several syncopal episodes and generalized weakness. she also reports a h/o insulin dependent DM, but was taken off insulin; and is currently not taking anything. She reports symptoms for the last month. In the ER, she was found to be orthostatic and this has persisted despite IVF and we are asked to admit/observation for continued IVF and monitoring. ROS: 
All systems have been reviewed and are negative except as stated in HPI or elsewhere. Past Medical History:  
Diagnosis Date  Diabetes ([address] History reviewed. No pertinent surgical history. Social History Tobacco Use  Smoking status: Former Smoker Packs/day: 1.00  Smokeless tobacco: Never Used Substance Use Topics  Alcohol use: Yes Comment: occ History reviewed. No pertinent family history. FH Reviewed and non-contributory to admitting diagnosis No Known Allergies Prior to Admission Medications Prescriptions Last Dose Informant Patient Reported? Taking?  
ibuprofen (MOTRIN IB) 200 mg tablet   Yes No  
Sig: Take  by mouth.  
lisinopril (PRINIVIL, ZESTRIL) 20 mg tablet   Yes Yes Sig: Take 20 mg by mouth daily. Facility-Administered Medications: None Objective:  
 
Patient Vitals for the past 24 hrs: 
 Temp Pulse Resp BP SpO2  
18 0507    95/61   
18 0506    117/65   
18 0504    126/69 99 % 18 0500    126/69 99 % 18 0340    153/80 98 % 18 0322    91/55 100 % 18 0320  90 17 142/83 100 % 18 0319    142/83 100 % 18 2310 97.6 °F (36.4 °C) 93 18 (!) 141/92 100 % No intake or output data in the 24 hours ending 18 0529 Temp (24hrs), Av.6 °F (36.4 °C), Min:97.6 °F (36.4 °C), Max:97.6 °F (36.4 °C) Oxygen Therapy O2 Sat (%): 99 % (18 050) Pulse via Oximetry: 84 beats per minute (18 050) O2 Device: Room air (18) Body mass index is 25.5 kg/m². Physical Exam: 
 
General:    WD and WN, No apparent distress. Head:   Normocephalic, without obvious abnormality, atraumatic. Eyes:  PERRL; EOMI; sclera normal/non-icteric ENT:  Hearing is normal.  Oropharynx is clear with tacky mucous membranes Resp:    Clear to auscultation bilaterally. No Wheezing or Rhonchi. Resp are even and unlabored Heart[de-identified]  Regular rate and rhythm,  no murmur,   No LE edema Abdomen:   Soft, non-tender. Not distended. Bowel sounds normal.  hepato-splenomegaly -none Musc/SK: Muscle strength is good and tone normal; No cyanosis. No clubbing Skin:     Texture, turgor normal. No significant rashes or lesions. Neurologic: CN II - XII are grossly intact - other than Eye exam as noted above Psych: Alert and oriented x 4;  Judgement and insight are normal 
  
Data Review:  
Recent Results (from the past 24 hour(s)) EKG, 12 LEAD, INITIAL Collection Time: 18 11:26 PM  
Result Value Ref Range Ventricular Rate 89 BPM  
 Atrial Rate 89 BPM  
 P-R Interval 146 ms  
 QRS Duration 76 ms  
 Q-T Interval 376 ms QTC Calculation (Bezet) 457 ms Calculated P Axis 72 degrees Calculated R Axis 65 degrees Calculated T Axis 76 degrees Diagnosis Normal sinus rhythm Nonspecific T wave abnormality Abnormal ECG No previous ECGs available CBC WITH AUTOMATED DIFF Collection Time: 18 11:35 PM  
Result Value Ref Range WBC 10.6 4.3 - 11.1 K/uL  
 RBC 4.66 4.05 - 5.2 M/uL  
 HGB 12.5 11.7 - 15.4 g/dL HCT 37.6 35.8 - 46.3 % MCV 80.7 79.6 - 97.8 FL  
 MCH 26.8 26.1 - 32.9 PG  
 MCHC 33.2 31.4 - 35.0 g/dL  
 RDW 12.9 11.9 - 14.6 % PLATELET 843 431 - 742 K/uL MPV 11.3 9.4 - 12.3 FL ABSOLUTE NRBC 0.00 0.0 - 0.2 K/uL  
 DF AUTOMATED NEUTROPHILS 79 (H) 43 - 78 % LYMPHOCYTES 14 13 - 44 % MONOCYTES 6 4.0 - 12.0 % EOSINOPHILS 0 (L) 0.5 - 7.8 % BASOPHILS 0 0.0 - 2.0 % IMMATURE GRANULOCYTES 0 0.0 - 5.0 %  
 ABS. NEUTROPHILS 8.4 (H) 1.7 - 8.2 K/UL  
 ABS. LYMPHOCYTES 1.4 0.5 - 4.6 K/UL  
 ABS. MONOCYTES 0.7 0.1 - 1.3 K/UL  
 ABS. EOSINOPHILS 0.0 0.0 - 0.8 K/UL  
 ABS. BASOPHILS 0.0 0.0 - 0.2 K/UL  
 ABS. IMM. GRANS. 0.0 0.0 - 0.5 K/UL METABOLIC PANEL, COMPREHENSIVE Collection Time: 11/30/18 11:35 PM  
Result Value Ref Range Sodium 127 (L) 136 - 145 mmol/L Potassium 3.4 (L) 3.5 - 5.1 mmol/L Chloride 87 (L) 98 - 107 mmol/L  
 CO2 31 21 - 32 mmol/L Anion gap 9 mmol/L Glucose 539 (HH) 65 - 100 mg/dL BUN 20 6 - 23 MG/DL Creatinine 1.38 (H) 0.6 - 1.0 MG/DL  
 GFR est AA 51 (L) >60 ml/min/1.73m2 GFR est non-AA 42 ml/min/1.73m2 Calcium 9.0 8.3 - 10.4 MG/DL Bilirubin, total 0.5 0.2 - 1.1 MG/DL  
 ALT (SGPT) 17 12 - 65 U/L  
 AST (SGOT) 11 (L) 15 - 37 U/L Alk. phosphatase 181 (H) 50 - 136 U/L Protein, total 7.0 g/dL Albumin 3.1 (L) 3.5 - 5.0 g/dL Globulin 3.9 (H) 2.3 - 3.5 g/dL A-G Ratio 0.8 URINE MICROSCOPIC Collection Time: 11/30/18 11:38 PM  
Result Value Ref Range WBC >100 0 /hpf  
 RBC 10-20 0 /hpf Epithelial cells 5-10 0 /hpf Bacteria 1+ (H) 0 /hpf Casts 0 0 /lpf Crystals, urine 0 0 /LPF Mucus 0 0 /lpf  
GLUCOSE, POC Collection Time: 12/01/18  1:49 AM  
Result Value Ref Range Glucose (POC) 350 (H) 65 - 100 mg/dL GLUCOSE, POC Collection Time: 12/01/18  4:54 AM  
Result Value Ref Range Glucose (POC) 262 (H) 65 - 100 mg/dL POC LACTIC ACID Collection Time: 12/01/18  4:56 AM  
Result Value Ref Range Lactic Acid (POC) 1.63 0.5 - 1.9 mmol/L CXR Results  (Last 48 hours) 12/01/18 0110  XR CHEST PA LAT Final result Impression:  IMPRESSION:  No acute process. Narrative:  EXAM:  Chest x-ray. DATE:  December 1, 2018. INDICATION:  Weakness and hypotension. COMPARISON:  June 28, 2007. TECHNIQUE:  Frontal and lateral x-rays of the chest were obtained. FINDINGS:  The lungs are clear. The heart, mediastinal contour and pulmonary  
vasculature are within normal limits. No pneumothorax, pleural effusion or acute  
displaced fracture is identified. CT Results  (Last 48 hours) 12/01/18 0129  CT HEAD WO CONT Final result Impression:  IMPRESSION:  No acute intracranial process. Narrative:  EXAM:  Noncontrast CT head. DATE:  December 1, 2018. INDICATION:  Weakness and dizziness. COMPARISON: None. TECHNIQUE: Axial noncontrast CT images of the head were obtained. Radiation dose reduction techniques were used for this study. Our CT scanners  
use one or all of the following: Automated exposure control, adjustment of the  
mA and/or kV according to patient size, iterative reconstruction. FINDINGS:  Brain volume is appropriate for age. No acute infarct, hemorrhage or  
mass is identified. There is no mass effect, midline shift or evidence of  
hydrocephalus. No fracture is seen in the skull or skull base. The mastoids are  
clear. Mild mucosal thickening is noted in the left sphenoid sinus. Assessment and Plan: Active Hospital Problems Diagnosis Date Noted  Orthostatic hypotension 12/01/2018  Uncontrolled type 2 diabetes mellitus with hyperglycemia ([address] 12/01/2018  
  untreated Principal Problem: 
  Orthostatic hypotension (12/1/2018) Cont IVF Active Problems: 
  Uncontrolled type 2 diabetes mellitus with hyperglycemia ([address] (12/1/2018) Start glipizide 5 mg bid; diabetic education and close/frequent outpt follow-up · PLAN General 
·  
· Cont appropriate home meds (see MAR) · Control symptoms (pain, n/v, fever, etc) · Monitor appropriate labs · DVT prophylaxis:  Lovenox · Code status: Full;  HCPOA:  
· Risk: mod · Anticipated DC needs: · Estimated LOS:  less than 2 midnights · Plans discussed with patient and/or caregiver; questions answered. Med records reviewed if applicable; findings:  
 
Critical care time if applicable:   
 
Signed By: Portia Blount MD   
 December 1, 2018

## 2018-12-01 NOTE — PROGRESS NOTES
TRANSFER - IN REPORT: 
 
Verbal report received from [facility] (name) on Barbara Stanley  being received from ED (unit) for routine progression of care Report consisted of patients Situation, Background, Assessment and  
Recommendations(SBAR). Information from the following report(s) SBAR, Kardex, ED Summary, Intake/Output, MAR, Accordion and Recent Results was reviewed with the receiving nurse. Opportunity for questions and clarification was provided. Assessment completed upon patients arrival to unit and care assumed.

## 2018-12-01 NOTE — PROGRESS NOTES
Pt received in room from ER. A/O x 4. Oriented to room and call lights. Pt c/o headache at this time. No acute distress noted. Pt reported she fell yesterday. All safety measures in place. Call light within reach.

## 2018-12-01 NOTE — ED PROVIDER NOTES
68-year-old female patient presents with reports of multiple syncopal episodes, diffuse weakness and suspicion of hyperglycemia. Patient has many, vague complaints including pain on the left side of her body. She states the symptoms have been intermittent and ongoing for at least one month. However, patient states tonight she became very lightheaded and lost consciousness striking her head against the bathtub during the fall. She reports a history of insulin-dependent diabetes but states she was taken off all of her diabetic medication by her primary care provider. She states she was instructed by this provider to come to the hospital on 11/20/2018. She's been seen in this department previously for similar symptoms and discharged home. Patient currently is taking no medication to control diabetes and does not check her blood sugar regularly at home. There is no report of fever, chills, chest pain, shortness of breath, nausea, vomiting, changes in bowel or bladder habits. The history is provided by the patient. Lethargy This is a chronic problem. The current episode started more than 1 week ago. The problem occurs constantly. The problem has been gradually worsening. Associated symptoms include headaches. Pertinent negatives include no chest pain, no abdominal pain and no shortness of breath. Past Medical History:  
Diagnosis Date  Diabetes [address] [address] History reviewed. No pertinent surgical history. History reviewed. No pertinent family history. Social History Socioeconomic History  Marital status: SINGLE Spouse name: Not on file  Number of children: Not on file  Years of education: Not on file  Highest education level: Not on file Social Needs  Financial resource strain: Not on file  Food insecurity - worry: Not on file  Food insecurity - inability: Not on file  Transportation needs - medical: Not on file  Transportation needs - non-medical: Not on file Occupational History  Not on file Tobacco Use  Smoking status: Former Smoker Packs/day: 1.00  Smokeless tobacco: Never Used Substance and Sexual Activity  Alcohol use: Yes Comment: occ  Drug use: No  
 Sexual activity: Not on file Other Topics Concern  Not on file Social History Narrative  Not on file ALLERGIES: Patient has no known allergies. Review of Systems Constitutional: Negative for chills, diaphoresis and fever. HENT: Negative for congestion, sneezing and sore throat. Eyes: Negative for visual disturbance. Respiratory: Negative for cough, chest tightness, shortness of breath and wheezing. Cardiovascular: Negative for chest pain and leg swelling. Gastrointestinal: Negative for abdominal pain, blood in stool, diarrhea, nausea and vomiting. Endocrine: Negative for polyuria. Genitourinary: Negative for difficulty urinating, dysuria, flank pain, hematuria and urgency. Musculoskeletal: Positive for myalgias. Negative for back pain, neck pain and neck stiffness. Skin: Negative for color change and rash. Neurological: Positive for weakness, light-headedness and headaches. Negative for dizziness, syncope, speech difficulty and numbness. Psychiatric/Behavioral: Negative for behavioral problems. All other systems reviewed and are negative. Vitals:  
 12/01/18 9575 12/01/18 0320 12/01/18 2274 12/01/18 0340 BP: 142/83 142/83 91/55 153/80 Pulse:  90 Resp:  17 Temp:      
SpO2: 100% 100% 100% 98% Height:      
      
 
Physical Exam  
Constitutional: She is oriented to person, place, and time. She appears well-developed and well-nourished. No distress. Overall well-appearing female patient, Alert and oriented to person place and time. No acute distress, speaks in clear, fluid sentences. HENT:  
Head: Normocephalic and atraumatic.   
Right Ear: External ear normal.  
 Left Ear: External ear normal.  
Nose: Nose normal.  
Eyes: EOM are normal. Pupils are equal, round, and reactive to light. Neck: Normal range of motion. Cardiovascular: Normal rate, regular rhythm, normal heart sounds and intact distal pulses. Exam reveals no gallop and no friction rub. No murmur heard. Pulmonary/Chest: Effort normal and breath sounds normal. No respiratory distress. She has no wheezes. She has no rales. She exhibits no tenderness. Clear to auscultation throughout Abdominal: Soft. She exhibits no distension and no mass. There is no tenderness. There is no rebound and no guarding. No hernia. No focal finding Musculoskeletal: Normal range of motion. She exhibits no edema, tenderness or deformity. Neurological: She is alert and oriented to person, place, and time. No cranial nerve deficit. Skin: Skin is warm and dry. She is not diaphoretic. Nursing note and vitals reviewed. MDM Number of Diagnoses or Management Options Hyperglycemia: new and requires workup Syncope and collapse: new and requires workup Urinary tract infection with hematuria, site unspecified: new and requires workup Diagnosis management comments: Laboratory evaluation reveals significant hyperglycemia at 539. No evidence of DKA. Urinalysis shows evidence of UTI the patient denies symptoms with these findings. Hyponatremia corrected to normal value with correction for blood glucose. EKG obtained on arrival shows a normal sinus rhythm without evidence of acute ischemic change or significant interval prolongation Patient received 10 units of insulin and a liter of fluids. She is also gotten Rocephin shortly after arrival. 
Discussed patient case with hospitalist who recommends close outpatient follow-up and oral treatment of underlying diabetes. Repeat orthostatic evaluation reveals significant orthostasis upon upright positioning, patient states she is unable to stand secondary to weakness. We will repeat normal saline liter bolus. Amount and/or Complexity of Data Reviewed Clinical lab tests: ordered and reviewed Tests in the radiology section of CPT®: ordered and reviewed Tests in the medicine section of CPT®: ordered and reviewed Discuss the patient with other providers: yes Independent visualization of images, tracings, or specimens: yes Risk of Complications, Morbidity, and/or Mortality Presenting problems: moderate Diagnostic procedures: low Management options: moderate Patient Progress Patient progress: stable Procedures

## 2018-12-01 NOTE — PROGRESS NOTES
Chart screened by  for discharge planning. No needs identified at this time. Please consult  if any new issues arise. WENDY Esquivel  [email or web address]

## 2018-12-01 NOTE — DISCHARGE INSTRUCTIONS
Take the medication prescribed as directed. It is very important that you monitor your blood sugar and record these results for discussion with your primary care provider. Arrange follow-up with one of the providers listed. Fainting: Care Instructions  Your Care Instructions    When you faint, or pass out, you lose consciousness for a short time. A brief drop in blood flow to the brain often causes it. When you fall or lie down, more blood flows to your brain and you regain consciousness. Emotional stress, pain, or overheating--especially if you have been standing--can make you faint. In these cases, fainting is usually not serious. But fainting can be a sign of a more serious problem. Your doctor may want you to have more tests to rule out other causes. The treatment you need depends on the reason why you fainted. The doctor has checked you carefully, but problems can develop later. If you notice any problems or new symptoms, get medical treatment right away. Follow-up care is a key part of your treatment and safety. Be sure to make and go to all appointments, and call your doctor if you are having problems. It's also a good idea to know your test results and keep a list of the medicines you take. How can you care for yourself at home? · Drink plenty of fluids to prevent dehydration. If you have kidney, heart, or liver disease and have to limit fluids, talk with your doctor before you increase your fluid intake. When should you call for help? Call 911 anytime you think you may need emergency care. For example, call if:    · You have symptoms of a heart problem. These may include:  ? Chest pain or pressure. ? Severe trouble breathing. ? A fast or irregular heartbeat. ? Lightheadedness or sudden weakness. ? Coughing up pink, foamy mucus. ? Passing out. After you call 911, the  may tell you to chew 1 adult-strength or 2 to 4 low-dose aspirin. Wait for an ambulance.  Do not try to drive yourself.     · You have symptoms of a stroke. These may include:  ? Sudden numbness, tingling, weakness, or loss of movement in your face, arm, or leg, especially on only one side of your body. ? Sudden vision changes. ? Sudden trouble speaking. ? Sudden confusion or trouble understanding simple statements. ? Sudden problems with walking or balance. ? A sudden, severe headache that is different from past headaches.     · You passed out (lost consciousness) again.    Watch closely for changes in your health, and be sure to contact your doctor if:    · You do not get better as expected. Where can you learn more? Go to http://russell-piper.info/. Enter W182 in the search box to learn more about \"Fainting: Care Instructions. \"  Current as of: November 20, 2017  Content Version: 11.8  © 6349-3843 [employer]. Care instructions adapted under license by [employer] (which disclaims liability or warranty for this information). If you have questions about a medical condition or this instruction, always ask your healthcare professional. [address] any warranty or liability for your use of this information. Learning About High Blood Sugar  What is high blood sugar? Your body turns the food you eat into glucose (sugar), which it uses for energy. But if your body isn't able to use the sugar right away, it can build up in your blood and lead to high blood sugar. When the amount of sugar in your blood stays too high for too much of the time, you may have diabetes. Diabetes is a disease that can cause serious health problems. The good news is that lifestyle changes may help you get your blood sugar back to normal and avoid or delay diabetes. What causes high blood sugar? Sugar (glucose) can build up in your blood if you:  · Are overweight. · Have a family history of diabetes. · Take certain medicines, such as steroids.   What are the symptoms? Having high blood sugar may not cause any symptoms at all. Or it may make you feel very thirsty or very hungry. You may also urinate more often than usual, have blurry vision, or lose weight without trying. How is high blood sugar treated? You can take steps to lower your blood sugar level if you understand what makes it get higher. Your doctor may want you to learn how to test your blood sugar level at home. Then you can see how illness, stress, or different kinds of food or medicine raise or lower your blood sugar level. Other tests may be needed to see if you have diabetes. How can you prevent high blood sugar? · Watch your weight. If you're overweight, losing just a small amount of weight may help. Reducing fat around your waist is most important. · Limit the amount of calories, sweets, and unhealthy fat you eat. Ask your doctor if a dietitian can help you. A registered dietitian can help you create meal plans that fit your lifestyle. · Get at least 30 minutes of exercise on most days of the week. Exercise helps control your blood sugar. It also helps you maintain a healthy weight. Walking is a good choice. You also may want to do other activities, such as running, swimming, cycling, or playing tennis or team sports. · If your doctor prescribed medicines, take them exactly as prescribed. Call your doctor if you think you are having a problem with your medicine. You will get more details on the specific medicines your doctor prescribes. Follow-up care is a key part of your treatment and safety. Be sure to make and go to all appointments, and call your doctor if you are having problems. It's also a good idea to know your test results and keep a list of the medicines you take. Where can you learn more? Go to http://russell-piper.info/. Enter O108 in the search box to learn more about \"Learning About High Blood Sugar. \"  Current as of: December 7, 2017  Content Version: 11.8  © [id number] [employer]. Care instructions adapted under license by [employer] (which disclaims liability or warranty for this information). If you have questions about a medical condition or this instruction, always ask your healthcare professional. Jeniffer Marshall any warranty or liability for your use of this information. Urinary Tract Infection in Women: Care Instructions  Your Care Instructions    A urinary tract infection, or UTI, is a general term for an infection anywhere between the kidneys and the urethra (where urine comes out). Most UTIs are bladder infections. They often cause pain or burning when you urinate. UTIs are caused by bacteria and can be cured with antibiotics. Be sure to complete your treatment so that the infection goes away. Follow-up care is a key part of your treatment and safety. Be sure to make and go to all appointments, and call your doctor if you are having problems. It's also a good idea to know your test results and keep a list of the medicines you take. How can you care for yourself at home? · Take your antibiotics as directed. Do not stop taking them just because you feel better. You need to take the full course of antibiotics. · Drink extra water and other fluids for the next day or two. This may help wash out the bacteria that are causing the infection. (If you have kidney, heart, or liver disease and have to limit fluids, talk with your doctor before you increase your fluid intake.)  · Avoid drinks that are carbonated or have caffeine. They can irritate the bladder. · Urinate often. Try to empty your bladder each time. · To relieve pain, take a hot bath or lay a heating pad set on low over your lower belly or genital area. Never go to sleep with a heating pad in place. To prevent UTIs  · Drink plenty of water each day. This helps you urinate often, which clears bacteria from your system.  (If you have kidney, heart, or liver disease and have to limit fluids, talk with your doctor before you increase your fluid intake.)  · Urinate when you need to. · Urinate right after you have sex. · Change sanitary pads often. · Avoid douches, bubble baths, feminine hygiene sprays, and other feminine hygiene products that have deodorants. · After going to the bathroom, wipe from front to back. When should you call for help? Call your doctor now or seek immediate medical care if:    · Symptoms such as fever, chills, nausea, or vomiting get worse or appear for the first time.     · You have new pain in your back just below your rib cage. This is called flank pain.     · There is new blood or pus in your urine.     · You have any problems with your antibiotic medicine.    Watch closely for changes in your health, and be sure to contact your doctor if:    · You are not getting better after taking an antibiotic for 2 days.     · Your symptoms go away but then come back. Where can you learn more? Go to http://russell-piper.info/. Enter M750 in the search box to learn more about \"Urinary Tract Infection in Women: Care Instructions. \"  Current as of: March 21, 2018  Content Version: 11.8  © 2626-5983 [employer]. Care instructions adapted under license by [employer] (which disclaims liability or warranty for this information). If you have questions about a medical condition or this instruction, always ask your healthcare professional. [address] any warranty or liability for your use of this information.

## 2018-12-01 NOTE — ED NOTES
Collected supine and sitting blood pressure for orthostats, but pt was unable to stand for last BP due to dizziness. MD informed.

## 2018-12-01 NOTE — PROGRESS NOTES
Admitted to the unit, acute elevation of glucose level, medicated in the ER, alert, oriented  X 4, neurovascular checks WDL, states \" left leg weakness\", lungs clear bilateral. No compliants of pain, inculding calf.

## 2018-12-02 LAB
ANION GAP SERPL CALC-SCNC: 6 MMOL/L
BASOPHILS # BLD: 0 K/UL (ref 0–0.2)
BASOPHILS NFR BLD: 0 % (ref 0–2)
BUN SERPL-MCNC: 15 MG/DL (ref 6–23)
CALCIUM SERPL-MCNC: 8 MG/DL (ref 8.3–10.4)
CHLORIDE SERPL-SCNC: 105 MMOL/L (ref 98–107)
CO2 SERPL-SCNC: 28 MMOL/L (ref 21–32)
CREAT SERPL-MCNC: 0.75 MG/DL (ref 0.6–1)
DIFFERENTIAL METHOD BLD: ABNORMAL
EOSINOPHIL # BLD: 0.1 K/UL (ref 0–0.8)
EOSINOPHIL NFR BLD: 1 % (ref 0.5–7.8)
ERYTHROCYTE [DISTWIDTH] IN BLOOD BY AUTOMATED COUNT: 13.3 % (ref 11.9–14.6)
GLUCOSE BLD STRIP.AUTO-MCNC: 100 MG/DL (ref 65–100)
GLUCOSE BLD STRIP.AUTO-MCNC: 205 MG/DL (ref 65–100)
GLUCOSE BLD STRIP.AUTO-MCNC: 255 MG/DL (ref 65–100)
GLUCOSE BLD STRIP.AUTO-MCNC: 282 MG/DL (ref 65–100)
GLUCOSE BLD STRIP.AUTO-MCNC: 66 MG/DL (ref 65–100)
GLUCOSE SERPL-MCNC: 62 MG/DL (ref 65–100)
HCT VFR BLD AUTO: 31.7 % (ref 35.8–46.3)
HGB BLD-MCNC: 10.6 G/DL (ref 11.7–15.4)
IMM GRANULOCYTES # BLD: 0 K/UL (ref 0–0.5)
IMM GRANULOCYTES NFR BLD AUTO: 0 % (ref 0–5)
LYMPHOCYTES # BLD: 1.9 K/UL (ref 0.5–4.6)
LYMPHOCYTES NFR BLD: 21 % (ref 13–44)
MCH RBC QN AUTO: 26.7 PG (ref 26.1–32.9)
MCHC RBC AUTO-ENTMCNC: 33.4 G/DL (ref 31.4–35)
MCV RBC AUTO: 79.8 FL (ref 79.6–97.8)
MONOCYTES # BLD: 0.6 K/UL (ref 0.1–1.3)
MONOCYTES NFR BLD: 7 % (ref 4–12)
NEUTS SEG # BLD: 6.6 K/UL (ref 1.7–8.2)
NEUTS SEG NFR BLD: 71 % (ref 43–78)
NRBC # BLD: 0 K/UL (ref 0–0.2)
PLATELET # BLD AUTO: 174 K/UL (ref 150–450)
PMV BLD AUTO: 11.1 FL (ref 9.4–12.3)
POTASSIUM SERPL-SCNC: 3 MMOL/L (ref 3.5–5.1)
RBC # BLD AUTO: 3.97 M/UL (ref 4.05–5.2)
SODIUM SERPL-SCNC: 139 MMOL/L (ref 136–145)
WBC # BLD AUTO: 9.2 K/UL (ref 4.3–11.1)

## 2018-12-02 PROCEDURE — 74011250637 HC RX REV CODE- 250/637: Performed by: FAMILY MEDICINE

## 2018-12-02 PROCEDURE — 80048 BASIC METABOLIC PNL TOTAL CA: CPT

## 2018-12-02 PROCEDURE — 74011636637 HC RX REV CODE- 636/637: Performed by: FAMILY MEDICINE

## 2018-12-02 PROCEDURE — 77030020263 HC SOL INJ SOD CL0.9% LFCR 1000ML

## 2018-12-02 PROCEDURE — 74011636637 HC RX REV CODE- 636/637: Performed by: INTERNAL MEDICINE

## 2018-12-02 PROCEDURE — 74011250637 HC RX REV CODE- 250/637: Performed by: INTERNAL MEDICINE

## 2018-12-02 PROCEDURE — 74011250636 HC RX REV CODE- 250/636: Performed by: INTERNAL MEDICINE

## 2018-12-02 PROCEDURE — 99218 HC RM OBSERVATION: CPT

## 2018-12-02 PROCEDURE — 36415 COLL VENOUS BLD VENIPUNCTURE: CPT

## 2018-12-02 PROCEDURE — 74011250636 HC RX REV CODE- 250/636: Performed by: FAMILY MEDICINE

## 2018-12-02 PROCEDURE — 85025 COMPLETE CBC W/AUTO DIFF WBC: CPT

## 2018-12-02 PROCEDURE — 82962 GLUCOSE BLOOD TEST: CPT

## 2018-12-02 RX ORDER — INSULIN GLARGINE 100 [IU]/ML
15 INJECTION, SOLUTION SUBCUTANEOUS
Status: DISCONTINUED | OUTPATIENT
Start: 2018-12-02 | End: 2018-12-04

## 2018-12-02 RX ORDER — LOPERAMIDE HYDROCHLORIDE 2 MG/1
2 CAPSULE ORAL AS NEEDED
Status: DISCONTINUED | OUTPATIENT
Start: 2018-12-02 | End: 2018-12-06 | Stop reason: HOSPADM

## 2018-12-02 RX ORDER — SODIUM CHLORIDE 9 MG/ML
125 INJECTION, SOLUTION INTRAVENOUS CONTINUOUS
Status: DISCONTINUED | OUTPATIENT
Start: 2018-12-02 | End: 2018-12-03 | Stop reason: ALTCHOICE

## 2018-12-02 RX ORDER — HYDRALAZINE HYDROCHLORIDE 20 MG/ML
20 INJECTION INTRAMUSCULAR; INTRAVENOUS
Status: DISCONTINUED | OUTPATIENT
Start: 2018-12-02 | End: 2018-12-05

## 2018-12-02 RX ORDER — POTASSIUM CHLORIDE 20 MEQ/1
40 TABLET, EXTENDED RELEASE ORAL EVERY 4 HOURS
Status: COMPLETED | OUTPATIENT
Start: 2018-12-02 | End: 2018-12-02

## 2018-12-02 RX ORDER — CEPHALEXIN 500 MG/1
500 CAPSULE ORAL EVERY 12 HOURS
Status: DISCONTINUED | OUTPATIENT
Start: 2018-12-02 | End: 2018-12-05

## 2018-12-02 RX ADMIN — Medication 10 ML: at 06:22

## 2018-12-02 RX ADMIN — SODIUM CHLORIDE 125 ML/HR: 900 INJECTION, SOLUTION INTRAVENOUS at 17:35

## 2018-12-02 RX ADMIN — SODIUM CHLORIDE 1000 ML: 900 INJECTION, SOLUTION INTRAVENOUS at 08:45

## 2018-12-02 RX ADMIN — INSULIN HUMAN 6 UNITS: 100 INJECTION, SOLUTION PARENTERAL at 22:13

## 2018-12-02 RX ADMIN — INSULIN GLARGINE 15 UNITS: 100 INJECTION, SOLUTION SUBCUTANEOUS at 22:13

## 2018-12-02 RX ADMIN — GLIPIZIDE 5 MG: 5 TABLET ORAL at 08:43

## 2018-12-02 RX ADMIN — ENOXAPARIN SODIUM 40 MG: 40 INJECTION, SOLUTION INTRAVENOUS; SUBCUTANEOUS at 08:43

## 2018-12-02 RX ADMIN — INSULIN HUMAN 6 UNITS: 100 INJECTION, SOLUTION PARENTERAL at 16:31

## 2018-12-02 RX ADMIN — CEPHALEXIN 500 MG: 500 CAPSULE ORAL at 09:24

## 2018-12-02 RX ADMIN — POTASSIUM CHLORIDE 40 MEQ: 20 TABLET, EXTENDED RELEASE ORAL at 12:08

## 2018-12-02 RX ADMIN — Medication 10 ML: at 01:09

## 2018-12-02 RX ADMIN — LOPERAMIDE HYDROCHLORIDE 2 MG: 2 CAPSULE ORAL at 17:34

## 2018-12-02 RX ADMIN — CEPHALEXIN 500 MG: 500 CAPSULE ORAL at 20:33

## 2018-12-02 RX ADMIN — GLIPIZIDE 5 MG: 5 TABLET ORAL at 16:31

## 2018-12-02 RX ADMIN — POTASSIUM CHLORIDE 40 MEQ: 20 TABLET, EXTENDED RELEASE ORAL at 09:24

## 2018-12-02 RX ADMIN — INSULIN HUMAN 4 UNITS: 100 INJECTION, SOLUTION PARENTERAL at 11:50

## 2018-12-02 NOTE — PROGRESS NOTES
Shift assessment complete. Pt resting in bed. A&O x4. Respirations present, even, unlabored. HR regular, S1&S2 ausculted. IVF infusing without difficulty. Denies pain at this time. Bed in lowest position, call light within reach, side rails x3. Will continue to monitor throughout the shift.

## 2018-12-02 NOTE — PROGRESS NOTES
Problem: Falls - Risk of 
Goal: *Absence of Falls Document Cl[facility] Fall Risk and appropriate interventions in the flowsheet. Outcome: Progressing Towards Goal 
Fall Risk Interventions: 
Mobility Interventions: Bed/chair exit alarm Medication Interventions: Bed/chair exit alarm Elimination Interventions: Call light in reach

## 2018-12-02 NOTE — PROGRESS NOTES
Shift assessment complete. Patient a&o x 4. Respirations present even and unlabored. Breath sounds clear. Heart sounds regular. Bowel sounds active in all 4 quadrants. Pt needs met, no distress noted. Bed low and locked. Call light within reach. Will continue to monitor hourly during shift.

## 2018-12-02 NOTE — PROGRESS NOTES
MD notified of BS of 357. MD ordered to go ahead and give Humulin R and Lanuts per [facility]. No new orders received at this time.

## 2018-12-02 NOTE — PROGRESS NOTES
Pt having loose bowel movements. Received telephone order for Imodium 2 mg PO from Dr. Anahy Barriga.

## 2018-12-02 NOTE — PROGRESS NOTES
Hospitalist Progress Note Admit Date:  2018 11:45 PM  
Name:  Josr Choudhury Age:  45 y.o. 
:  1965 MRN:  [id number] PCP:  Reggie Araujo MD 
Treatment Team: Attending Provider: Yodit Hill MD; Utilization Review: Pat End Subjective: The patient is a 46 y/o F with HTN, DM 2, was admitted for syncope likely due to orthostatic hypotension, hyperglycemia in the context of uncontrolled DM type 2 and possible UTI. HbA1c came back at 14.0. She was started on IV fluids, Glipizide and Insulin Glargine. She feels slightly better today, but reports feeling weak. Denies any dizziness. Blood glucose this morning was 66 and Glargine dose was reduced from 20 units to 15 units qhs. Objective:  
 
Patient Vitals for the past 24 hrs: 
 Temp Pulse Resp BP SpO2  
18 1136 97.7 °F (36.5 °C) 76 17 146/81 99 % 18 0809  69 16 128/77 98 % 18 0421 98.6 °F (37 °C) 84 17 129/83 97 % 18 0034 98.6 °F (37 °C) 88 17 121/62 98 % 18 1949 98 °F (36.7 °C) 80 18 163/83 99 % Oxygen Therapy O2 Sat (%): 99 % (18 1136) Pulse via Oximetry: 84 beats per minute (18 0504) O2 Device: Room air (18 0843) Intake/Output Summary (Last 24 hours) at 2018 1659 Last data filed at 2018 8702 Gross per 24 hour Intake 617 ml Output  Net 617 ml General:    Well nourished. Alert. CV:   RRR. No murmur, rub, or gallop. Lungs:   Clear to auscultation bilaterally. No wheezing, rhonchi, or rales. Abdomen:   Soft, nontender, nondistended. Extremities: Warm and dry. No cyanosis or edema. Skin:     No rashes or jaundice. Data Review: 
I have reviewed all labs, meds, telemetry events, and studies from the last 24 hours. Recent Results (from the past 24 hour(s)) GLUCOSE, POC Collection Time: 18  5:49 PM  
Result Value Ref Range Glucose (POC) 218 (H) 65 - 100 mg/dL GLUCOSE, POC  
 Collection Time: 12/01/18  9:38 PM  
Result Value Ref Range Glucose (POC) 357 (H) 65 - 100 mg/dL METABOLIC PANEL, BASIC Collection Time: 12/02/18  5:36 AM  
Result Value Ref Range Sodium 139 136 - 145 mmol/L Potassium 3.0 (L) 3.5 - 5.1 mmol/L Chloride 105 98 - 107 mmol/L  
 CO2 28 21 - 32 mmol/L Anion gap 6 mmol/L Glucose 62 (L) 65 - 100 mg/dL BUN 15 6 - 23 MG/DL Creatinine 0.75 0.6 - 1.0 MG/DL  
 GFR est AA >60 >60 ml/min/1.73m2 GFR est non-AA >60 ml/min/1.73m2 Calcium 8.0 (L) 8.3 - 10.4 MG/DL  
CBC WITH AUTOMATED DIFF Collection Time: 12/02/18  5:36 AM  
Result Value Ref Range WBC 9.2 4.3 - 11.1 K/uL  
 RBC 3.97 (L) 4.05 - 5.2 M/uL  
 HGB 10.6 (L) 11.7 - 15.4 g/dL HCT 31.7 (L) 35.8 - 46.3 % MCV 79.8 79.6 - 97.8 FL  
 MCH 26.7 26.1 - 32.9 PG  
 MCHC 33.4 31.4 - 35.0 g/dL  
 RDW 13.3 11.9 - 14.6 % PLATELET 030 464 - 584 K/uL MPV 11.1 9.4 - 12.3 FL ABSOLUTE NRBC 0.00 0.0 - 0.2 K/uL  
 DF AUTOMATED NEUTROPHILS 71 43 - 78 % LYMPHOCYTES 21 13 - 44 % MONOCYTES 7 4.0 - 12.0 % EOSINOPHILS 1 0.5 - 7.8 % BASOPHILS 0 0.0 - 2.0 % IMMATURE GRANULOCYTES 0 0.0 - 5.0 %  
 ABS. NEUTROPHILS 6.6 1.7 - 8.2 K/UL  
 ABS. LYMPHOCYTES 1.9 0.5 - 4.6 K/UL  
 ABS. MONOCYTES 0.6 0.1 - 1.3 K/UL  
 ABS. EOSINOPHILS 0.1 0.0 - 0.8 K/UL  
 ABS. BASOPHILS 0.0 0.0 - 0.2 K/UL  
 ABS. IMM. GRANS. 0.0 0.0 - 0.5 K/UL GLUCOSE, POC Collection Time: 12/02/18  7:33 AM  
Result Value Ref Range Glucose (POC) 66 65 - 100 mg/dL GLUCOSE, POC Collection Time: 12/02/18  8:09 AM  
Result Value Ref Range Glucose (POC) 100 65 - 100 mg/dL GLUCOSE, POC Collection Time: 12/02/18 11:35 AM  
Result Value Ref Range Glucose (POC) 205 (H) 65 - 100 mg/dL GLUCOSE, POC Collection Time: 12/02/18  4:19 PM  
Result Value Ref Range Glucose (POC) 255 (H) 65 - 100 mg/dL All Micro Results Procedure Component Value Units Date/Time CULTURE, URINE [[id number]]  (Abnormal) Collected:  11/30/18 0983 Order Status:  Completed Specimen:  Urine from Clean catch Updated:  12/02/18 7744 Special Requests: NO SPECIAL REQUESTS Culture result:    
  >100,000 COLONIES/mL GRAM NEGATIVE RODS IDENTIFICATION AND SUSCEPTIBILITY TO FOLLOW Current Meds: 
Current Facility-Administered Medications Medication Dose Route Frequency  cephALEXin (KEFLEX) capsule 500 mg  500 mg Oral Q12H  
 insulin glargine (LANTUS) injection 15 Units  15 Units SubCUTAneous QHS  sodium chloride (NS) flush 5-10 mL  5-10 mL IntraVENous Q8H  
 sodium chloride (NS) flush 5-10 mL  5-10 mL IntraVENous PRN  
 acetaminophen (TYLENOL) tablet 650 mg  650 mg Oral Q4H PRN  
 HYDROcodone-acetaminophen (NORCO)  mg tablet 1 Tab  1 Tab Oral Q4H PRN  
 naloxone (NARCAN) injection 0.4 mg  0.4 mg IntraVENous PRN  
 diphenhydrAMINE (BENADRYL) capsule 25 mg  25 mg Oral Q4H PRN  
 bisacodyl (DULCOLAX) tablet 5 mg  5 mg Oral DAILY PRN  
 LORazepam (ATIVAN) tablet 1 mg  1 mg Oral BID PRN  
 enoxaparin (LOVENOX) injection 40 mg  40 mg SubCUTAneous Q24H  
 insulin regular (NOVOLIN R, HUMULIN R) injection   SubCUTAneous AC&HS  
 dextrose 40% (GLUTOSE) oral gel 1 Tube  15 g Oral PRN  
 glucagon (GLUCAGEN) injection 1 mg  1 mg IntraMUSCular PRN  
 dextrose (D50W) injection syrg 12.5-25 g  25-50 mL IntraVENous PRN  
 ondansetron (ZOFRAN) injection 4 mg  4 mg IntraVENous Q4H PRN  
 glipiZIDE (GLUCOTROL) tablet 5 mg  5 mg Oral ACB&D Other Studies (last 24 hours): No results found. Assessment and Plan:  
 
Hospital Problems as of 12/2/2018 Never Reviewed Codes Class Noted - Resolved POA * (Principal) Orthostatic hypotension ICD-10-CM: I95.1 ICD-9-CM: 458.0  12/1/2018 - Present Yes Uncontrolled type 2 diabetes mellitus with hyperglycemia (HCC) (Chronic) ICD-10-CM: E11.65 ICD-9-CM: 250.02  12/1/2018 - Present Yes Overview Signed 12/1/2018  5:22 AM by Eric Saez MD  
  untreated 1 - Syncope 2 - Orthostatic Hypotension 3 - Uncontrolled DM type 2 
4 - UTI (urine culture positive for Gram negative rods) PLAN:   
· Check orthostatic VS q 8 hours. · Continue IV fluids. · Continue Glipizide. Reduce Glargine to 15 units qhs, as glucose was only 66 this morning. · Sliding scale insulin · Diabetic education · Start Keflex for UTI 
 
DC planning/Dispo: Home possibly tomorrow pending further clinical improvement DVT ppx: with Lovenox Signed: Hallie Kamara MD

## 2018-12-03 LAB
ANION GAP SERPL CALC-SCNC: 6 MMOL/L
BACTERIA SPEC CULT: ABNORMAL
BASOPHILS # BLD: 0 K/UL (ref 0–0.2)
BASOPHILS NFR BLD: 0 % (ref 0–2)
BUN SERPL-MCNC: 15 MG/DL (ref 6–23)
CALCIUM SERPL-MCNC: 8.2 MG/DL (ref 8.3–10.4)
CHLORIDE SERPL-SCNC: 107 MMOL/L (ref 98–107)
CO2 SERPL-SCNC: 24 MMOL/L (ref 21–32)
CREAT SERPL-MCNC: 0.74 MG/DL (ref 0.6–1)
DIFFERENTIAL METHOD BLD: ABNORMAL
EOSINOPHIL # BLD: 0.1 K/UL (ref 0–0.8)
EOSINOPHIL NFR BLD: 1 % (ref 0.5–7.8)
ERYTHROCYTE [DISTWIDTH] IN BLOOD BY AUTOMATED COUNT: 13.4 % (ref 11.9–14.6)
GLUCOSE BLD STRIP.AUTO-MCNC: 251 MG/DL (ref 65–100)
GLUCOSE BLD STRIP.AUTO-MCNC: 258 MG/DL (ref 65–100)
GLUCOSE BLD STRIP.AUTO-MCNC: 270 MG/DL (ref 65–100)
GLUCOSE BLD STRIP.AUTO-MCNC: 287 MG/DL (ref 65–100)
GLUCOSE SERPL-MCNC: 268 MG/DL (ref 65–100)
HCT VFR BLD AUTO: 33.1 % (ref 35.8–46.3)
HGB BLD-MCNC: 10.9 G/DL (ref 11.7–15.4)
IMM GRANULOCYTES # BLD: 0 K/UL (ref 0–0.5)
IMM GRANULOCYTES NFR BLD AUTO: 0 % (ref 0–5)
LYMPHOCYTES # BLD: 1.7 K/UL (ref 0.5–4.6)
LYMPHOCYTES NFR BLD: 20 % (ref 13–44)
MCH RBC QN AUTO: 26.5 PG (ref 26.1–32.9)
MCHC RBC AUTO-ENTMCNC: 32.9 G/DL (ref 31.4–35)
MCV RBC AUTO: 80.3 FL (ref 79.6–97.8)
MONOCYTES # BLD: 0.6 K/UL (ref 0.1–1.3)
MONOCYTES NFR BLD: 7 % (ref 4–12)
NEUTS SEG # BLD: 6.1 K/UL (ref 1.7–8.2)
NEUTS SEG NFR BLD: 71 % (ref 43–78)
NRBC # BLD: 0 K/UL (ref 0–0.2)
PLATELET # BLD AUTO: 185 K/UL (ref 150–450)
PMV BLD AUTO: 10.9 FL (ref 9.4–12.3)
POTASSIUM SERPL-SCNC: 4 MMOL/L (ref 3.5–5.1)
RBC # BLD AUTO: 4.12 M/UL (ref 4.05–5.2)
SERVICE CMNT-IMP: ABNORMAL
SODIUM SERPL-SCNC: 137 MMOL/L (ref 136–145)
WBC # BLD AUTO: 8.5 K/UL (ref 4.3–11.1)

## 2018-12-03 PROCEDURE — 74011250636 HC RX REV CODE- 250/636: Performed by: FAMILY MEDICINE

## 2018-12-03 PROCEDURE — 74011636637 HC RX REV CODE- 636/637: Performed by: HOSPITALIST

## 2018-12-03 PROCEDURE — 85025 COMPLETE CBC W/AUTO DIFF WBC: CPT

## 2018-12-03 PROCEDURE — 36415 COLL VENOUS BLD VENIPUNCTURE: CPT

## 2018-12-03 PROCEDURE — 65270000029 HC RM PRIVATE

## 2018-12-03 PROCEDURE — 93306 TTE W/DOPPLER COMPLETE: CPT

## 2018-12-03 PROCEDURE — 74011250637 HC RX REV CODE- 250/637: Performed by: INTERNAL MEDICINE

## 2018-12-03 PROCEDURE — 74011636637 HC RX REV CODE- 636/637: Performed by: INTERNAL MEDICINE

## 2018-12-03 PROCEDURE — 99218 HC RM OBSERVATION: CPT

## 2018-12-03 PROCEDURE — 74011636637 HC RX REV CODE- 636/637: Performed by: FAMILY MEDICINE

## 2018-12-03 PROCEDURE — 74011250637 HC RX REV CODE- 250/637: Performed by: FAMILY MEDICINE

## 2018-12-03 PROCEDURE — 74011250637 HC RX REV CODE- 250/637: Performed by: HOSPITALIST

## 2018-12-03 PROCEDURE — 74011250636 HC RX REV CODE- 250/636: Performed by: INTERNAL MEDICINE

## 2018-12-03 PROCEDURE — 80048 BASIC METABOLIC PNL TOTAL CA: CPT

## 2018-12-03 PROCEDURE — 77030020263 HC SOL INJ SOD CL0.9% LFCR 1000ML

## 2018-12-03 PROCEDURE — 82962 GLUCOSE BLOOD TEST: CPT

## 2018-12-03 PROCEDURE — 97161 PT EVAL LOW COMPLEX 20 MIN: CPT

## 2018-12-03 RX ORDER — INSULIN LISPRO 100 [IU]/ML
5 INJECTION, SOLUTION INTRAVENOUS; SUBCUTANEOUS
Status: DISCONTINUED | OUTPATIENT
Start: 2018-12-03 | End: 2018-12-06 | Stop reason: HOSPADM

## 2018-12-03 RX ORDER — METFORMIN HYDROCHLORIDE 500 MG/1
500 TABLET ORAL 2 TIMES DAILY WITH MEALS
Status: DISCONTINUED | OUTPATIENT
Start: 2018-12-03 | End: 2018-12-04

## 2018-12-03 RX ORDER — INSULIN LISPRO 100 [IU]/ML
INJECTION, SOLUTION INTRAVENOUS; SUBCUTANEOUS
Status: DISCONTINUED | OUTPATIENT
Start: 2018-12-03 | End: 2018-12-06 | Stop reason: HOSPADM

## 2018-12-03 RX ORDER — LISINOPRIL 20 MG/1
20 TABLET ORAL DAILY
Status: DISCONTINUED | OUTPATIENT
Start: 2018-12-03 | End: 2018-12-03

## 2018-12-03 RX ADMIN — SODIUM CHLORIDE 125 ML/HR: 900 INJECTION, SOLUTION INTRAVENOUS at 01:14

## 2018-12-03 RX ADMIN — Medication 10 ML: at 14:00

## 2018-12-03 RX ADMIN — ENOXAPARIN SODIUM 40 MG: 40 INJECTION, SOLUTION INTRAVENOUS; SUBCUTANEOUS at 08:13

## 2018-12-03 RX ADMIN — CEPHALEXIN 500 MG: 500 CAPSULE ORAL at 08:13

## 2018-12-03 RX ADMIN — INSULIN LISPRO 5 UNITS: 100 INJECTION, SOLUTION INTRAVENOUS; SUBCUTANEOUS at 17:15

## 2018-12-03 RX ADMIN — CEPHALEXIN 500 MG: 500 CAPSULE ORAL at 22:37

## 2018-12-03 RX ADMIN — INSULIN GLARGINE 15 UNITS: 100 INJECTION, SOLUTION SUBCUTANEOUS at 22:38

## 2018-12-03 RX ADMIN — GLIPIZIDE 5 MG: 5 TABLET ORAL at 07:47

## 2018-12-03 RX ADMIN — METFORMIN HYDROCHLORIDE 500 MG: 500 TABLET ORAL at 17:16

## 2018-12-03 RX ADMIN — INSULIN HUMAN 6 UNITS: 100 INJECTION, SOLUTION PARENTERAL at 07:47

## 2018-12-03 RX ADMIN — INSULIN HUMAN 6 UNITS: 100 INJECTION, SOLUTION PARENTERAL at 11:36

## 2018-12-03 RX ADMIN — Medication 10 ML: at 22:38

## 2018-12-03 RX ADMIN — INSULIN LISPRO 6 UNITS: 100 INJECTION, SOLUTION INTRAVENOUS; SUBCUTANEOUS at 22:38

## 2018-12-03 RX ADMIN — SODIUM CHLORIDE 125 ML/HR: 900 INJECTION, SOLUTION INTRAVENOUS at 11:10

## 2018-12-03 RX ADMIN — INSULIN LISPRO 6 UNITS: 100 INJECTION, SOLUTION INTRAVENOUS; SUBCUTANEOUS at 17:15

## 2018-12-03 NOTE — PROGRESS NOTES
Problem: Falls - Risk of 
Goal: *Absence of Falls Document [place] Fall Risk and appropriate interventions in the flowsheet. Outcome: Progressing Towards Goal 
Fall Risk Interventions: 
Mobility Interventions: Bed/chair exit alarm Medication Interventions: Bed/chair exit alarm Elimination Interventions: Call light in reach

## 2018-12-03 NOTE — DIABETES MGMT
Patient admitted with orthostatic hypotension, seen by diabetes educator. Patient voices a positive family history of diabetes and states she was diagnosed less than five years ago. Patient states she has never attended diabetic classes or seen an endocrinologist. Patient states she was seeing Dr. Pierre Bettencourt who had placed her on a V-Go patch pump with Humalog in it. Patient states she kept having hypoglycemia on the pump so she quit wearing it. She states she wanted to be switched to another regimen, (\"I wanted to switch to the pen like my momma takes\") but the provider was argumentative with her and she didn't go back to pursue it. Patient would like a new provider she feels listens to her and would be willing to work with her. 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 Patient states that her appetite waxes and wanes and this was occurring when she was wearing the pump as well. Patient states she was only checking her blood glucose once or twice a day. Educated patient that a pump releases a set amount of insulin consistently regardless of activity or appetite and that her inconsistent diet may have led to bouts of hypoglycemia. Patient verbalized understanding. Blood glucose 539 on admission. Blood glucose ranged  yesterday with patient receiving Lantus 15 units, Humalog 16 units, and glipizide 10 mg. Blood glucose 258 this morning. A1c 14 (eA). Reviewed with patient. Pt given educational material, \"Diabetes Self-Management: A Patient Teaching Guide\", which was reviewed with pt. Explained basic physiology of diabetes, as well as causes, s/s, and treatments for hypoglycemia and hyperglycemia. Described the effects of poor glycemic control on the development of long-term complications such as renal, eye, nerve, and cardiovascular disease. Described proper diabetic foot care and the importance of checking feet qd.  Educated re: effects of carbohydrates on blood glucose, the \"plate method\" of healthy meal planning, basics of healthy meal plan, and Consistent Carbohydrate Diet. Also explained the relationship between hyperglycemia and infection. Discussed the benefits of exercise on glycemic control, but stressed with the hypotension issues she has she would need to be cleared by provider for exercise and then set realistic attainable goals for her body. Discussed target goals for blood glucose and A1C. Pt verbalizes understanding of teaching. Educated pt re: current basal/bolus regimen of Lantus 15 units QHS and Humalog SSI including type of insulin, timing with meals, onset, duration of effect, and peak of insulin dose. Pt verbalizes understanding. Given control patient would likely benefit from increase in basal insulin as fasting is not at goal and initiation of prandial insulin to improve glycemic control during the day. Patient is to start metformin this afternoon. Patient states she is familiar with metformin as she was taking it in the past. 
 
Explained the importance of blood glucose monitoring. Encouraged patient to view her glucometer as a tool to help her ensure she is well controlled or not and to help her figure out how her meals are impacting her glycemic control. Recommended frequency of qid ac, hs, and to record in log book to take to PCP appointment. Patient states she needs a prescription for glucometer and supplies, she has a meter but isn't sure if it is covered by her insurance or not. Patient instructed re: preparation and injection of insulin dose. Patient politely refused to return demonstrated proper insulin injection technique using injection model. \" I like the pen better. \"Nursing will continue to have patient practice insulin self-injection when insulin dose is due.  Patient also verbalizes understanding of site rotation, storage of insulin, and proper sharps disposal. Pt was also instructed in proper use of insulin pens. Pt was able to return demonstrate proper use of insulin pen using injection model. Patient prefers insulin pens. Patient would likely benefit from continued diabetes outpatient education. Patient provided with contact information to [facility] program to pursue at their convenience after discharge with their PCP. Patient to agreeable to plan. Patient is willing to discharge on a basal bolus regimen. Patient would like to be connected to a new PCP. Patient will need prescription for insulin pens, insulin pen needles, and glucometer and supplies at discharge. Will consult nutrition per patient request as patient is eager to learn while she is here. Patient encouraged to be compliant with discharge regimen, establish herself with and follow up with a new PCP for titration of regimen. Patient to review information and educator will follow up tomorrow to answer any questions.

## 2018-12-03 NOTE — PROGRESS NOTES
Shift assessment complete. Pt resting in bed. A&O x4. Respirations present, even, unlabored. Lung sounds clear to auscultation. HR regular, S1&S2 auscultated. IVF infusing without difficulty. Abdomen soft with active bowel sounds in all 4 quadrants. No complaints of pain at this time. Bed in lowest position, call light within reach, side rails x3. Will continue to monitor throughout the shift.

## 2018-12-03 NOTE — PROGRESS NOTES
Spoke to MD again after taking orthostatic blood pressures (lying 195/101, sitting 212/109, standing 135/93). Orders to not administer hydralazine at this time.

## 2018-12-03 NOTE — PROGRESS NOTES
Initial visit made to patient and a prayer was provided. The patient shared that the 's prayer was just what she needed as she felt discouraged because of her poor health. Luc Gaytan

## 2018-12-03 NOTE — PROGRESS NOTES
Problem: Mobility Impaired (Adult and Pediatric) Goal: *Acute Goals and Plan of Care (Insert Text) STG: 
(1.)Ms. Ashley Huston will move from supine to sit and sit to supine  with INDEPENDENT within three treatment day(s). (2.)Ms. Ashley Huston will transfer from bed to chair and chair to bed with INDEPENDENT using the least restrictive device within three treatment day(s). (3.)Ms. Ashley Huston will ambulate with CGA-SBA for 250 feet with the least restrictive device within three treatment day(s). LTG: 
(1.)Ms. Ashley Huston will ambulate with INDEPENDENT for 250 feet with the least restrictive device within seven treatment day(s). ________________________________________________________________________________________________ Outcome: Progressing Towards Goal 
 
PHYSICAL THERAPY: Initial Assessment 12/3/2018INPATIENT: Hospital Day: 4 Payor: BLUE CROSS / [place] [facility] / Product Type: PPO /  
  
NAME/AGE/GENDER: Briseyda Celis is a 45 y.o. female PRIMARY DIAGNOSIS: Orthostatic hypotension [I95.1] Orthostatic hypotension [I95.1] Orthostatic hypotension Orthostatic hypotension ICD-10: Treatment Diagnosis: · Difficulty in walking, Not elsewhere classified (R26.2) · Other abnormalities of gait and mobility (R26.89) Precaution/Allergies: 
Patient has no known allergies. ASSESSMENT:  
Ms. Ashley Huston presents with decreased independence with functional mobility. Pt plans to return home following hospital stay. Pt performed supine to sit to stand. Pt ambulated to head of bed. Pt returned supine with needs in reach. Pt being followed by Shira Dominique. Pt's appointment for tomorrow was cancelled by [facility], acute rehab, who called outpt. Pt's next appointment with outpt is Dec. 6. 
 
This section established at most recent assessment PROBLEM LIST (Impairments causing functional limitations): 1. Decreased Strength 2. Decreased Transfer Abilities 3. Decreased Ambulation Ability/Technique 4. Decreased Balance 5. Increased Pain 6. Decreased Activity Tolerance 7. Decreased Flexibility/Joint Mobility INTERVENTIONS PLANNED: (Benefits and precautions of physical therapy have been discussed with the patient.) 1. Bed Mobility 2. Gait Training 3. Therapeutic Activites 4. Therapeutic Exercise/Strengthening 5. Transfer Training TREATMENT PLAN: Frequency/Duration: daily for duration of hospital stay Rehabilitation Potential For Stated Goals: Good RECOMMENDED REHABILITATION/EQUIPMENT: (at time of discharge pending progress): Due to the probability of continued deficits (see above) this patient will likely need continued skilled physical therapy after discharge. Equipment:  
? None at this time HISTORY:  
History of Present Injury/Illness (Reason for Referral): Pt with decreased mobility, due to above diagnosis. Past Medical History/Comorbidities: Ms. Sukhjinder Yu  has a past medical history of Diabetes ([facility] [address] Ms. Sukhjinder Yu  has no past surgical history on file. Social History/Living Environment:  
Home Environment: Private residence One/Two Story Residence: One story Living Alone: No 
Support Systems: [employer] community, Family member(s), Friends \ neighbors Patient Expects to be Discharged to[de-identified] Private residence Current DME Used/Available at Home: Cane, quad Prior Level of Function/Work/Activity: Pt walked with a cane with modified independence with functional mobility. Number of Personal Factors/Comorbidities that affect the Plan of Care: 0: LOW COMPLEXITY EXAMINATION:  
Most Recent Physical Functioning:  
Gross Assessment: 
AROM: Generally decreased, functional 
Strength: Generally decreased, functional 
Coordination: Generally decreased, functional 
         
  
Posture: 
Posture (WDL): Within defined limits Balance: 
Sitting: Intact Standing: Pull to stand; With support Bed Mobility: 
Supine to Sit: Stand-by assistance Wheelchair Mobility: 
  
Transfers: Sit to Stand: Minimum assistance Stand to Sit: Minimum assistance Gait: 
  
Gait Abnormalities: (slightly unsteady steps to head of bed) Distance (ft): 5 Feet (ft)(to head of bed) Ambulation - Level of Assistance: Stand-by assistance Body Structures Involved: 1. Bones 2. Joints 3. Muscles 4. Ligaments Body Functions Affected: 1. Neuromusculoskeletal 
2. Movement Related Activities and Participation Affected: 1. Mobility Number of elements that affect the Plan of Care: 4+: HIGH COMPLEXITY CLINICAL PRESENTATION:  
Presentation: Stable and uncomplicated: LOW COMPLEXITY CLINICAL DECISION MAKING:  
[facility] AM-[facility]AC 6 Clicks Basic Mobility Inpatient Short Form How much difficulty does the patient currently have. .. Unable A Lot A Little None 1. Turning over in bed (including adjusting bedclothes, sheets and blankets)? [] 1   [] 2   [x] 3   [] 4  
2. Sitting down on and standing up from a chair with arms ( e.g., wheelchair, bedside commode, etc.)   [] 1   [] 2   [x] 3   [] 4  
3. Moving from lying on back to sitting on the side of the bed? [] 1   [] 2   [x] 3   [] 4 How much help from another person does the patient currently need. .. Total A Lot A Little None 4. Moving to and from a bed to a chair (including a wheelchair)? [] 1   [] 2   [x] 3   [] 4  
5. Need to walk in hospital room? [] 1   [] 2   [x] 3   [] 4  
6. Climbing 3-5 steps with a railing? [] 1   [] 2   [x] 3   [] 4  
© 2007, Trustees of [facility], under license to [employer]. All rights reserved Score:  Initial: 18 Most Recent: X (Date: -- ) Interpretation of Tool:  Represents activities that are increasingly more difficult (i.e. Bed mobility, Transfers, Gait). Score 24 23 22-20 19-15 14-10 9-7 6 Modifier CH CI CJ CK CL CM CN   
 
? Mobility - Walking and Moving Around:  
  - CURRENT STATUS: CK - 40%-59% impaired, limited or restricted  - GOAL STATUS: CK - 40%-59% impaired, limited or restricted  - D/C STATUS:  CK - 40%-59% impaired, limited or restricted Payor: BLUE CROSS / [place] [facility] / Product Type: PPO /   
 
Medical Necessity:    
· Skilled intervention continues to be required due to decreased mobility ability. Reason for Services/Other Comments: 
· Patient continues to require skilled intervention due to decreased mobility ability. Use of outcome tool(s) and clinical judgement create a POC that gives a: Clear prediction of patient's progress: LOW COMPLEXITY  
  
 
 
 
TREATMENT:  
(In addition to Assessment/Re-Assessment sessions the following treatments were rendered) Pre-treatment Symptoms/Complaints:  
Pain: Initial:  
   Post Session:  No complaints Assessment/Reassessment only, no treatment provided today Braces/Orthotics/Lines/Etc:  
· O2 Device: Room air Treatment/Session Assessment:   
· Response to Treatment:  Pt agreeable to take steps to head of bed. · Interdisciplinary Collaboration:  
o Physical Therapist 
o Registered Nurse · After treatment position/precautions:  
o sitting edge of bed · Compliance with Program/Exercises: Compliant most of the time · Recommendations/Intent for next treatment session: \"Next visit will focus on advancements to more challenging activities and reduction in assistance provided\". Total Treatment Duration: PT Patient Time In/Time Out Time In: 7221 Time Out: 1545 Jorge Calle PT

## 2018-12-03 NOTE — PROGRESS NOTES
Problem: Interdisciplinary Rounds Goal: Interdisciplinary Rounds Interdisciplinary team rounds were held 12/3/2018 with the following team members:Care Management, Nursing, Nutrition, Pharmacy, Physical Therapy and Physician and the patient. Plan of care discussed. See clinical pathway and/or care plan for interventions and desired outcomes.

## 2018-12-03 NOTE — PROGRESS NOTES
Hospitalist Progress Note Admit Date:  2018 11:45 PM  
Name:  Paolo Rick Age:  45 y.o. 
:  1965 MRN:  [id number] PCP:  Kaila Kent MD 
Treatment Team: Attending Provider: Nano Abebe MD; Utilization Review: Jonh Flores RN Subjective: The patient is a 46 y/o F with HTN, DM 2, was admitted for syncope likely due to orthostatic hypotension, hyperglycemia in the context of uncontrolled DM type 2 and possible UTI. HbA1c came back at 14.0. She still has significant dizziness, Orthostatic hypotension, No CP, palpitations, No diarrhea, N/V 
 
Objective:  
 
Patient Vitals for the past 24 hrs: 
 Temp Pulse Resp BP SpO2  
18 1215 97.5 °F (36.4 °C) 80 18 175/90 97 % 18 0742 98 °F (36.7 °C) 88 18 (!) 183/98 95 % 18 0345 97.9 °F (36.6 °C) 89 18 (!) 163/101 97 % 18 2314 98 °F (36.7 °C)  20  99 % 18 2313 98.2 °F (36.8 °C)  18  98 % 18 2311 98.7 °F (37.1 °C)  18  98 % 18 1920 98 °F (36.7 °C) 84 18 (!) 195/101 99 % 18 1700 97.2 °F (36.2 °C) 77 16 (!) 186/106 99 % Oxygen Therapy O2 Sat (%): 97 % (18 1215) Pulse via Oximetry: 84 beats per minute (18 0504) O2 Device: Room air (18 0725) Intake/Output Summary (Last 24 hours) at 12/3/2018 1356 Last data filed at 12/3/2018 1143 Gross per 24 hour Intake 1929 ml Output 350 ml Net 1579 ml General:    Well nourished. Alert. CV:   RRR. No murmur, rub, or gallop. Lungs:   Clear to auscultation bilaterally. No wheezing, rhonchi, or rales. Abdomen:   Soft, nontender, nondistended. Extremities: Warm and dry. No cyanosis or edema. Skin:     No rashes or jaundice. Data Review: 
I have reviewed all labs, meds, telemetry events, and studies from the last 24 hours. Recent Results (from the past 24 hour(s)) GLUCOSE, POC Collection Time: 18  4:19 PM  
Result Value Ref Range Glucose (POC) 255 (H) 65 - 100 mg/dL GLUCOSE, POC Collection Time: 12/02/18  9:31 PM  
Result Value Ref Range Glucose (POC) 282 (H) 65 - 100 mg/dL METABOLIC PANEL, BASIC Collection Time: 12/03/18  6:11 AM  
Result Value Ref Range Sodium 137 136 - 145 mmol/L Potassium 4.0 3.5 - 5.1 mmol/L Chloride 107 98 - 107 mmol/L  
 CO2 24 21 - 32 mmol/L Anion gap 6 mmol/L Glucose 268 (H) 65 - 100 mg/dL BUN 15 6 - 23 MG/DL Creatinine 0.74 0.6 - 1.0 MG/DL  
 GFR est AA >60 >60 ml/min/1.73m2 GFR est non-AA >60 ml/min/1.73m2 Calcium 8.2 (L) 8.3 - 10.4 MG/DL  
CBC WITH AUTOMATED DIFF Collection Time: 12/03/18  6:11 AM  
Result Value Ref Range WBC 8.5 4.3 - 11.1 K/uL  
 RBC 4.12 4.05 - 5.2 M/uL  
 HGB 10.9 (L) 11.7 - 15.4 g/dL HCT 33.1 (L) 35.8 - 46.3 % MCV 80.3 79.6 - 97.8 FL  
 MCH 26.5 26.1 - 32.9 PG  
 MCHC 32.9 31.4 - 35.0 g/dL  
 RDW 13.4 11.9 - 14.6 % PLATELET 560 935 - 178 K/uL MPV 10.9 9.4 - 12.3 FL ABSOLUTE NRBC 0.00 0.0 - 0.2 K/uL  
 DF AUTOMATED NEUTROPHILS 71 43 - 78 % LYMPHOCYTES 20 13 - 44 % MONOCYTES 7 4.0 - 12.0 % EOSINOPHILS 1 0.5 - 7.8 % BASOPHILS 0 0.0 - 2.0 % IMMATURE GRANULOCYTES 0 0.0 - 5.0 %  
 ABS. NEUTROPHILS 6.1 1.7 - 8.2 K/UL  
 ABS. LYMPHOCYTES 1.7 0.5 - 4.6 K/UL  
 ABS. MONOCYTES 0.6 0.1 - 1.3 K/UL  
 ABS. EOSINOPHILS 0.1 0.0 - 0.8 K/UL  
 ABS. BASOPHILS 0.0 0.0 - 0.2 K/UL  
 ABS. IMM. GRANS. 0.0 0.0 - 0.5 K/UL GLUCOSE, POC Collection Time: 12/03/18  7:30 AM  
Result Value Ref Range Glucose (POC) 258 (H) 65 - 100 mg/dL GLUCOSE, POC Collection Time: 12/03/18 11:20 AM  
Result Value Ref Range Glucose (POC) 287 (H) 65 - 100 mg/dL All Micro Results Procedure Component Value Units Date/Time CULTURE, URINE [[id number]]  (Abnormal)  (Susceptibility) Collected:  11/30/18 2338 Order Status:  Completed Specimen:  Urine from Clean catch Updated:  12/03/18 9028 Special Requests: NO SPECIAL REQUESTS Culture result:    
  >100,000 COLONIES/mL KLEBSIELLA PNEUMONIAE Current Meds: 
Current Facility-Administered Medications Medication Dose Route Frequency  metFORMIN (GLUCOPHAGE) tablet 500 mg  500 mg Oral BID WITH MEALS  cephALEXin (KEFLEX) capsule 500 mg  500 mg Oral Q12H  
 insulin glargine (LANTUS) injection 15 Units  15 Units SubCUTAneous QHS  loperamide (IMODIUM) capsule 2 mg  2 mg Oral PRN  
 hydrALAZINE (APRESOLINE) 20 mg/mL injection 20 mg  20 mg IntraVENous Q6H PRN  
 sodium chloride (NS) flush 5-10 mL  5-10 mL IntraVENous Q8H  
 sodium chloride (NS) flush 5-10 mL  5-10 mL IntraVENous PRN  
 acetaminophen (TYLENOL) tablet 650 mg  650 mg Oral Q4H PRN  
 HYDROcodone-acetaminophen (NORCO)  mg tablet 1 Tab  1 Tab Oral Q4H PRN  
 naloxone (NARCAN) injection 0.4 mg  0.4 mg IntraVENous PRN  
 diphenhydrAMINE (BENADRYL) capsule 25 mg  25 mg Oral Q4H PRN  
 bisacodyl (DULCOLAX) tablet 5 mg  5 mg Oral DAILY PRN  
 LORazepam (ATIVAN) tablet 1 mg  1 mg Oral BID PRN  
 enoxaparin (LOVENOX) injection 40 mg  40 mg SubCUTAneous Q24H  
 insulin regular (NOVOLIN R, HUMULIN R) injection   SubCUTAneous AC&HS  
 dextrose 40% (GLUTOSE) oral gel 1 Tube  15 g Oral PRN  
 glucagon (GLUCAGEN) injection 1 mg  1 mg IntraMUSCular PRN  
 dextrose (D50W) injection syrg 12.5-25 g  25-50 mL IntraVENous PRN  
 ondansetron (ZOFRAN) injection 4 mg  4 mg IntraVENous Q4H PRN Other Studies (last 24 hours): No results found. Assessment and Plan:  
 
Hospital Problems as of 12/3/2018 Never Reviewed Codes Class Noted - Resolved POA * (Principal) Orthostatic hypotension ICD-10-CM: I95.1 ICD-9-CM: 458.0  12/1/2018 - Present Yes Uncontrolled type 2 diabetes mellitus with hyperglycemia (HCC) (Chronic) ICD-10-CM: E11.65 ICD-9-CM: 250.02  12/1/2018 - Present Yes  Overview Signed 12/1/2018  5:22 AM by Mimi Mayer MD  
 untreated 1 - Syncope 2 - Orthostatic Hypotension 3 - Uncontrolled DM type 2 
4 - UTI (urine culture positive for Gram negative rods) PLAN:   
· Still has significant drop in SBP around 60 points with standing and she is symptomatic, has received IVF, concerned about swellingi n legs, will stop the IVF Order for Echo, recCheck orthostatic in AM, Stop Lisinopril · Continue with Glargine to 15 units qhs, added metformin, Needs DM education · Cont with Sliding scale insulin · Started on Keflex for UTI, growing [place] · Order for PT/OT 
 
DC planning/Dispo: Home possibly tomorrow pending further clinical improvement DVT ppx: with Lovenox Signed: 
Indio Chaparro MD

## 2018-12-03 NOTE — PROGRESS NOTES
Problem: Falls - Risk of 
Goal: *Absence of Falls Document [place] Adrien Fall Risk and appropriate interventions in the flowsheet. Outcome: Progressing Towards Goal 
Fall Risk Interventions: 
Mobility Interventions: Patient to call before getting OOB Medication Interventions: Patient to call before getting OOB, Teach patient to arise slowly Elimination Interventions: Call light in reach, Patient to call for help with toileting needs, Toilet paper/wipes in reach

## 2018-12-03 NOTE — PROGRESS NOTES
Evening insulin administrated with 4 oz juice and leela cracker snack at bedside. Pt encouraged to snack if she experiences hypoglycemic symptoms

## 2018-12-03 NOTE — PROGRESS NOTES
Shift assessment completed. Pt alert and oriented x4. Lungs CTA with even and unlabored respirations. Heart sounds regular. Active bowel sounds with soft and non-tender abdomen. Skin warm and dry. IV c/d and infusing. Pt reports no pain or other needs at this time. Bed locked in lowest position with call light in reach and family at bedside

## 2018-12-03 NOTE — PHYSICIAN ADVISORY
Letter of Determination: Upgrade from Observation to Inpatient Status This patient was originally hospitalized as Outpatient Status with Observation Services on 12/1/2018 for orthostatic hypotension. This patient now meets for Inpatient Admission based on medical necessity. The patient's stay was medically necessary based on glucose of 539 mg/dl. It is our recommendation that this patient's hospitalization status should be upgraded from OBSERVATION to INPATIENT status.  
  
The final decision regarding the patient's hospitalization status depends on the attending physician's judgement. Areli Magallon MD, OMNE, Physician Advisor [address] [place] [address].

## 2018-12-03 NOTE — PROGRESS NOTES
Care Management Interventions PCP Verified by CM: Yes Mode of Transport at Discharge: Other (see comment) Transition of Care Consult (CM Consult): Other Current Support Network: Own Home Confirm Follow Up Transport: Family Plan discussed with Pt/Family/Caregiver: Yes Freedom of Choice Offered: Yes Discharge Location Discharge Placement: Home Visited with pt regarding plans for discharge, pt states that she lives home/alone/ works FT/drives/ inde with ADL's. PT/OT to see, no needs at this time, but will continue to follow until d/c. 
 
1330 Saw pt in interdisciplinarily rounds, plan of care and discharge date/ location discussed. Per the hospitalitis plans to d/c tomorrow 12/4. 
 
12/5 @ 1430 Pt in need of a new PCP, I have verified pt's address and emailed Blu Pedroza. Will check in am for appt time and pcp location.

## 2018-12-04 ENCOUNTER — HOSPITAL ENCOUNTER (OUTPATIENT)
Dept: PHYSICAL THERAPY | Age: 53
End: 2018-12-04

## 2018-12-04 LAB
ANION GAP SERPL CALC-SCNC: 8 MMOL/L
BASOPHILS # BLD: 0 K/UL (ref 0–0.2)
BASOPHILS NFR BLD: 1 % (ref 0–2)
BUN SERPL-MCNC: 13 MG/DL (ref 6–23)
CALCIUM SERPL-MCNC: 8.5 MG/DL (ref 8.3–10.4)
CHLORIDE SERPL-SCNC: 107 MMOL/L (ref 98–107)
CO2 SERPL-SCNC: 26 MMOL/L (ref 21–32)
CREAT SERPL-MCNC: 0.72 MG/DL (ref 0.6–1)
DIFFERENTIAL METHOD BLD: ABNORMAL
EOSINOPHIL # BLD: 0.1 K/UL (ref 0–0.8)
EOSINOPHIL NFR BLD: 1 % (ref 0.5–7.8)
ERYTHROCYTE [DISTWIDTH] IN BLOOD BY AUTOMATED COUNT: 13.3 % (ref 11.9–14.6)
GLUCOSE BLD STRIP.AUTO-MCNC: 163 MG/DL (ref 65–100)
GLUCOSE BLD STRIP.AUTO-MCNC: 193 MG/DL (ref 65–100)
GLUCOSE BLD STRIP.AUTO-MCNC: 211 MG/DL (ref 65–100)
GLUCOSE BLD STRIP.AUTO-MCNC: 237 MG/DL (ref 65–100)
GLUCOSE SERPL-MCNC: 162 MG/DL (ref 65–100)
HCT VFR BLD AUTO: 35.7 % (ref 35.8–46.3)
HGB BLD-MCNC: 11.6 G/DL (ref 11.7–15.4)
IMM GRANULOCYTES # BLD: 0 K/UL (ref 0–0.5)
IMM GRANULOCYTES NFR BLD AUTO: 0 % (ref 0–5)
LYMPHOCYTES # BLD: 1.8 K/UL (ref 0.5–4.6)
LYMPHOCYTES NFR BLD: 28 % (ref 13–44)
MCH RBC QN AUTO: 26.5 PG (ref 26.1–32.9)
MCHC RBC AUTO-ENTMCNC: 32.5 G/DL (ref 31.4–35)
MCV RBC AUTO: 81.7 FL (ref 79.6–97.8)
MONOCYTES # BLD: 0.4 K/UL (ref 0.1–1.3)
MONOCYTES NFR BLD: 6 % (ref 4–12)
NEUTS SEG # BLD: 4.2 K/UL (ref 1.7–8.2)
NEUTS SEG NFR BLD: 65 % (ref 43–78)
NRBC # BLD: 0 K/UL (ref 0–0.2)
PLATELET # BLD AUTO: 211 K/UL (ref 150–450)
PMV BLD AUTO: 11 FL (ref 9.4–12.3)
POTASSIUM SERPL-SCNC: 3.9 MMOL/L (ref 3.5–5.1)
RBC # BLD AUTO: 4.37 M/UL (ref 4.05–5.2)
SODIUM SERPL-SCNC: 141 MMOL/L (ref 136–145)
WBC # BLD AUTO: 6.5 K/UL (ref 4.3–11.1)

## 2018-12-04 PROCEDURE — 74011250637 HC RX REV CODE- 250/637: Performed by: INTERNAL MEDICINE

## 2018-12-04 PROCEDURE — 74011636637 HC RX REV CODE- 636/637: Performed by: INTERNAL MEDICINE

## 2018-12-04 PROCEDURE — 77030012890

## 2018-12-04 PROCEDURE — 85025 COMPLETE CBC W/AUTO DIFF WBC: CPT

## 2018-12-04 PROCEDURE — 82962 GLUCOSE BLOOD TEST: CPT

## 2018-12-04 PROCEDURE — 74011250636 HC RX REV CODE- 250/636: Performed by: FAMILY MEDICINE

## 2018-12-04 PROCEDURE — 97535 SELF CARE MNGMENT TRAINING: CPT

## 2018-12-04 PROCEDURE — 65660000000 HC RM CCU STEPDOWN

## 2018-12-04 PROCEDURE — 36415 COLL VENOUS BLD VENIPUNCTURE: CPT

## 2018-12-04 PROCEDURE — 74011250637 HC RX REV CODE- 250/637: Performed by: HOSPITALIST

## 2018-12-04 PROCEDURE — 80048 BASIC METABOLIC PNL TOTAL CA: CPT

## 2018-12-04 PROCEDURE — 74011636637 HC RX REV CODE- 636/637: Performed by: HOSPITALIST

## 2018-12-04 PROCEDURE — 97165 OT EVAL LOW COMPLEX 30 MIN: CPT

## 2018-12-04 RX ORDER — INSULIN GLARGINE 100 [IU]/ML
20 INJECTION, SOLUTION SUBCUTANEOUS
Status: DISCONTINUED | OUTPATIENT
Start: 2018-12-04 | End: 2018-12-05

## 2018-12-04 RX ADMIN — INSULIN LISPRO 4 UNITS: 100 INJECTION, SOLUTION INTRAVENOUS; SUBCUTANEOUS at 17:07

## 2018-12-04 RX ADMIN — INSULIN LISPRO 5 UNITS: 100 INJECTION, SOLUTION INTRAVENOUS; SUBCUTANEOUS at 07:35

## 2018-12-04 RX ADMIN — ENOXAPARIN SODIUM 40 MG: 40 INJECTION, SOLUTION INTRAVENOUS; SUBCUTANEOUS at 07:38

## 2018-12-04 RX ADMIN — HYDRALAZINE HYDROCHLORIDE 20 MG: 20 INJECTION INTRAMUSCULAR; INTRAVENOUS at 18:12

## 2018-12-04 RX ADMIN — INSULIN LISPRO 2 UNITS: 100 INJECTION, SOLUTION INTRAVENOUS; SUBCUTANEOUS at 07:35

## 2018-12-04 RX ADMIN — INSULIN LISPRO 4 UNITS: 100 INJECTION, SOLUTION INTRAVENOUS; SUBCUTANEOUS at 21:51

## 2018-12-04 RX ADMIN — INSULIN LISPRO 2 UNITS: 100 INJECTION, SOLUTION INTRAVENOUS; SUBCUTANEOUS at 12:03

## 2018-12-04 RX ADMIN — Medication 10 ML: at 13:51

## 2018-12-04 RX ADMIN — INSULIN GLARGINE 20 UNITS: 100 INJECTION, SOLUTION SUBCUTANEOUS at 21:52

## 2018-12-04 RX ADMIN — Medication 10 ML: at 21:55

## 2018-12-04 RX ADMIN — INSULIN LISPRO 5 UNITS: 100 INJECTION, SOLUTION INTRAVENOUS; SUBCUTANEOUS at 17:08

## 2018-12-04 RX ADMIN — CEPHALEXIN 500 MG: 500 CAPSULE ORAL at 07:40

## 2018-12-04 RX ADMIN — INSULIN LISPRO 5 UNITS: 100 INJECTION, SOLUTION INTRAVENOUS; SUBCUTANEOUS at 12:03

## 2018-12-04 RX ADMIN — METFORMIN HYDROCHLORIDE 500 MG: 500 TABLET ORAL at 07:39

## 2018-12-04 RX ADMIN — CEPHALEXIN 500 MG: 500 CAPSULE ORAL at 21:51

## 2018-12-04 RX ADMIN — Medication 10 ML: at 05:10

## 2018-12-04 NOTE — PROGRESS NOTES
Problem: Falls - Risk of 
Goal: *Absence of Falls Document Erika Boone Fall Risk and appropriate interventions in the flowsheet. Outcome: Progressing Towards Goal 
Fall Risk Interventions: 
Mobility Interventions: Patient to call before getting OOB, PT Consult for mobility concerns Medication Interventions: Patient to call before getting OOB, Teach patient to arise slowly Elimination Interventions: Call light in reach, Patient to call for help with toileting needs, Toileting schedule/hourly rounds

## 2018-12-04 NOTE — PROGRESS NOTES
Problem: Patient Education: Go to Patient Education Activity Goal: Patient/Family Education Nutrition Received MD consult for Diabetic Diet Education Assessment:  
Diet order(s): Consistent CHO Pt presented with orthostatic hypotension. Past medical history notable for DM2. Food and Nutrition History:  Pt works outside the home; generally eats lunch and supper at work; skips breakfast.  Pt checks her blood sugars 1-1 X day. Pt has been drinking sugared soft drinks (cokes) and does eat sweets at home; states she is ready to be more compliant with her diet. No nutrition risk factors identified on nursing admission malnutrition screening tool. Pertinent Labs:  HgbA1C: 14 with average glucose 355 mg/dl. POC glucose today:  193-163 Anthropometrics: Height: 5' 6\" (167.6 cm),  Weight: 58.2 kg (128 lb 3.2 oz), Weight Source: Standing scale (comment), Body mass index is 20.69 kg/m². BMI class of low normal range. Macronutrient Needs: 
· EER:  0655-5266 kcal /day (25-30 kcal/kg BW) · EPR:  58-70 grams protein/day (1-1.2 grams/kg IBW)  GFR >60 
· CHO needs:  182-218 grams CHO/day (50 % of kcal) Intake/Comparative Standards: Per RD meal rounds: eating lunch; had eaten > 80%. No recorded intake. Insufficient data to determine % estimated kcal or protein needs met at this time. Nutrition Diagnosis: Food and nutrition related knowledge deficit r/t lack of prior exposure to information as evidenced by no prior education provided on how to apply food and nutrition related information. Intervention: 
Meals and snacks: Continue current diet Education/Discharge nutrition plan: Provided patient with written and verbal instruction on a Consistent CHO diet with 4 CHO servings/meal; received \"My Meal Plan\".   Discussed importance of eating 3 meals/day; discussed sources of CHO, portion control, food preparation and how to read a food label. Pt verbalized good understanding and anticipate good compliance. Coordination of Nutrition Services:  Interdisciplinary Rounds Robi Rodas, MPH, [address],  
[phone]

## 2018-12-04 NOTE — DIABETES MGMT
. Reviewed current insulin regimen: Lantus 20 units hs, Humalog 5 units 3x/day, Humalog sliding scale coverage 4x/day ac and  hs including  type of insulin, timing with meals, onset, peak of action and `duration of effect. Also, Metformin 500 mg bid. Pt instructed re: preparation and injection of insulin dose using insulin pen method. Pt has practiced insulin self-injection under RN supervision. Nursing will continue to have pt practice insulin self-injection when insulin dose is due. Pt also verbalizes understanding of site rotation, storage of insulin, and proper sharps disposal.  
Explained the importance of blood glucose monitoring. Recommended frequency of qid ac, hs, and to record in log book to take to PCP appointment. Provided blood glucose meter, strips, and instructed pt in use of meter. Discussed target goals for HbA1c and blood glucose and the significance of an Hba1c of 14. Discussed the relationship between hyperglycemia dn infection. Stressed the importance of follow up care for diabetes managment with PCP. Pt would like to find a new PCP. Provided pt with a list of [facility] physicians. Discussed the importance of medication compliance, following  a consistent carbohydrate diet, and monitoring blood glucose qid. Encouraged pt to participate in further diabetes education. Pt states that she has already gone online and joined the [employer] and has received diabetic recipes. Pt given contact information for Bobby Coats to pursue at their convenience. Pt has no further questions at this time. Pt will need prescriptions for insulin pens, pen needles, blood glucose meter, lancets, and blood glucose test strips at discharge.

## 2018-12-04 NOTE — CONSULTS
[facility] Cardiology Consult    Attending Cardiologist:Dr. Wendy Cheema    Primary Cardiologist:none    Primary Care Physician:Dr. Garland Henriquez    Subjective:     Saad Dillon is a 45 y.o. female with long standing hx of type 2 DM but has not been compliant. She saw PCP in Sept with initiation of injectable for DM and lisinopril. She states that on Friday she experienced syncopal event. She was up going to bathroom early in AM when she had event. She apparently awoke on bathroom floor. She has been having several episodes of profound weakness and dizziness shortly after ambulating for last 2 months. She was diagnosed with UTI upon admission. Given IVF but her orthostatic hypotension persists. She does relate frequent bouts of tachycardia with her near syncopal events and tachycardia while lying down to go to bed at night. Poor appetite, 20 # wt loss in two months. Repeat orthostatic vitals---lying 82, 217/116, sitting 87 179/104, standing 99, 151/90. Echo with normal EF. No prior syncopal events except Friday's event. Past Medical History:   Diagnosis Date    Diabetes [facility])       History reviewed. No pertinent surgical history.    Current Facility-Administered Medications   Medication Dose Route Frequency    insulin glargine (LANTUS) injection 20 Units  20 Units SubCUTAneous QHS    insulin lispro (HUMALOG) injection   SubCUTAneous AC&HS    insulin lispro (HUMALOG) injection 5 Units  5 Units SubCUTAneous TIDAC    cephALEXin (KEFLEX) capsule 500 mg  500 mg Oral Q12H    loperamide (IMODIUM) capsule 2 mg  2 mg Oral PRN    hydrALAZINE (APRESOLINE) 20 mg/mL injection 20 mg  20 mg IntraVENous Q6H PRN    sodium chloride (NS) flush 5-10 mL  5-10 mL IntraVENous Q8H    sodium chloride (NS) flush 5-10 mL  5-10 mL IntraVENous PRN    acetaminophen (TYLENOL) tablet 650 mg  650 mg Oral Q4H PRN    HYDROcodone-acetaminophen (NORCO)  mg tablet 1 Tab  1 Tab Oral Q4H PRN    naloxone (NARCAN) injection 0.4 mg  0.4 mg IntraVENous PRN    diphenhydrAMINE (BENADRYL) capsule 25 mg  25 mg Oral Q4H PRN    bisacodyl (DULCOLAX) tablet 5 mg  5 mg Oral DAILY PRN    LORazepam (ATIVAN) tablet 1 mg  1 mg Oral BID PRN    enoxaparin (LOVENOX) injection 40 mg  40 mg SubCUTAneous Q24H    dextrose 40% (GLUTOSE) oral gel 1 Tube  15 g Oral PRN    glucagon (GLUCAGEN) injection 1 mg  1 mg IntraMUSCular PRN    dextrose (D50W) injection syrg 12.5-25 g  25-50 mL IntraVENous PRN    ondansetron (ZOFRAN) injection 4 mg  4 mg IntraVENous Q4H PRN     No Known Allergies   Social History     Tobacco Use    Smoking status: Former Smoker     Packs/day: 1.00    Smokeless tobacco: Never Used   Substance Use Topics    Alcohol use: Yes     Comment: occ      History reviewed. No pertinent family history. Review of Systems  Gen: as above  HEENT: Denies frequent headaches, dizzyness, visual disturbances, Neck pain or swallowing difficulty  Lungs: +SOB with near syncopal events. Cardiovascular: Denies chest pain, orthopnea, PND, no syncope or near syncope  GI: Denies hememesis, dark tarry stools, No prior Hx of GI bleed, Denies constipation  : Denies dysuria, no complaints of frequency, nocturia  Heme: No prior bleeding disorders, no prior Cancer  Neuro: Denies prior CVA, TIA. Endocrine: no diabetes, thyroid disorders  Psychiatric: Denies anxiety, or other psychiatric illnesses. Objective:     Visit Vitals  BP (!) 158/117 (BP 1 Location: Left arm, BP Patient Position: At rest) Comment: RN Notifed   Pulse 83   Temp 98.2 °F (36.8 °C)   Resp 18   Ht 5' 6\" (1.676 m)   Wt 58.2 kg (128 lb 3.2 oz)   SpO2 98%   BMI 20.69 kg/m²     General:Alert, cooperative, no distress, appears stated age  Head: Normocephalic, without obvious abnormality, atraumatic. Eyes: Conjunctivae/corneas clear. PERRL, EOMs intact  Nose:Nares normal. Septum midline. Mucosa normal. No drainage or sinus tenderness.    Throat: Lips, mucosa, and tongue normal. Teeth and gums normal. Neck: Supple, symmetrical, trachea midline,  no carotid bruit and no JVD. Lungs:Clear to auscultation bilaterally. Chest wall: No tenderness or deformity. Heart: Regular rate and rhythm, S1, S2 normal, no murmur, click, rub or gallop. Abdomen:Soft, non-tender. Bowel sounds normal. No masses, No organomegaly. Extremities: Extremities normal, atraumatic, no cyanosis or edema. Pulses: 2+ and symmetric all extremities. Skin: Skin color, texture, turgor normal. No rashes or lesions  Lymph nodes: Cervical, supraclavicular, and axillary nodes normal  Neurologic:No focal deficits identified                 ECG: NSR with nonspecific st/ t wave changes.      Data Review:     Recent Results (from the past 24 hour(s))   GLUCOSE, POC    Collection Time: 12/03/18 11:20 AM   Result Value Ref Range    Glucose (POC) 287 (H) 65 - 100 mg/dL   GLUCOSE, POC    Collection Time: 12/03/18  4:57 PM   Result Value Ref Range    Glucose (POC) 251 (H) 65 - 100 mg/dL   GLUCOSE, POC    Collection Time: 12/03/18  9:53 PM   Result Value Ref Range    Glucose (POC) 270 (H) 65 - 811 mg/dL   METABOLIC PANEL, BASIC    Collection Time: 12/04/18  5:22 AM   Result Value Ref Range    Sodium 141 136 - 145 mmol/L    Potassium 3.9 3.5 - 5.1 mmol/L    Chloride 107 98 - 107 mmol/L    CO2 26 21 - 32 mmol/L    Anion gap 8 mmol/L    Glucose 162 (H) 65 - 100 mg/dL    BUN 13 6 - 23 MG/DL    Creatinine 0.72 0.6 - 1.0 MG/DL    GFR est AA >60 >60 ml/min/1.73m2    GFR est non-AA >60 ml/min/1.73m2    Calcium 8.5 8.3 - 10.4 MG/DL   CBC WITH AUTOMATED DIFF    Collection Time: 12/04/18  5:22 AM   Result Value Ref Range    WBC 6.5 4.3 - 11.1 K/uL    RBC 4.37 4.05 - 5.2 M/uL    HGB 11.6 (L) 11.7 - 15.4 g/dL    HCT 35.7 (L) 35.8 - 46.3 %    MCV 81.7 79.6 - 97.8 FL    MCH 26.5 26.1 - 32.9 PG    MCHC 32.5 31.4 - 35.0 g/dL    RDW 13.3 11.9 - 14.6 %    PLATELET 452 070 - 642 K/uL    MPV 11.0 9.4 - 12.3 FL    ABSOLUTE NRBC 0.00 0.0 - 0.2 K/uL    DF AUTOMATED NEUTROPHILS 65 43 - 78 %    LYMPHOCYTES 28 13 - 44 %    MONOCYTES 6 4.0 - 12.0 %    EOSINOPHILS 1 0.5 - 7.8 %    BASOPHILS 1 0.0 - 2.0 %    IMMATURE GRANULOCYTES 0 0.0 - 5.0 %    ABS. NEUTROPHILS 4.2 1.7 - 8.2 K/UL    ABS. LYMPHOCYTES 1.8 0.5 - 4.6 K/UL    ABS. MONOCYTES 0.4 0.1 - 1.3 K/UL    ABS. EOSINOPHILS 0.1 0.0 - 0.8 K/UL    ABS. BASOPHILS 0.0 0.0 - 0.2 K/UL    ABS. IMM. GRANS. 0.0 0.0 - 0.5 K/UL   GLUCOSE, POC    Collection Time: 12/04/18  7:28 AM   Result Value Ref Range    Glucose (POC) 193 (H) 65 - 100 mg/dL         Assessment / Plan     Principal Problem:    Orthostatic hypotension (12/1/2018)--persistent with associated tachycardia and drop when standing. This is in setting of UTI, wt loss. BP at rest is uncontrolled. ? Diabetic polyneuropathy but wt loss, cachexia concerning--will place on monitor to evaluate for arrhythmias. Difficult situation with poorly controlled HTN at rest. Suspect she is still somewhat volume depleated contributing to scenario. Volume expanders such as midodrine/ florinef would only make her HTN worse. May benefit from OP cardiac event monitor. Consider OP stress given risk profile.      Active Problems:    Uncontrolled type 2 diabetes mellitus with hyperglycemia ([facility] [address] (12/1/2018)      Overview: untreated              Pamella Adams NP

## 2018-12-04 NOTE — PROGRESS NOTES
Hospitalist Progress Note Admit Date:  2018 11:45 PM  
Name:  Alonso Montero Age:  45 y.o. 
:  1965 MRN:  [id number] PCP:  Socrates Sidhu MD 
Treatment Team: Attending Provider: Jen Richardson MD; Utilization Review: Joanna Byers RN; Consulting Provider: Pedro Capellan MD 
 
Subjective:  
 
46 y/o F with HTN, uncontrolled DM 2 admitted for syncope likely due to orthostatic hypotension, hyperglycemia in the context of uncontrolled DM type 2 and possible UTI. HbA1c 14. Significant orthostasis persists. Pt reports dizziness again this AM with going to the bathroom. States \"something has to get better because I can't function like this. \" Denies CP, SOB, N/V. Full ROS negative aside from above. Objective:  
 
Patient Vitals for the past 24 hrs: 
 Temp Pulse Resp BP SpO2  
18 0830 98.2 °F (36.8 °C) 83 18 (!) 158/117 98 % 18 0422 98 °F (36.7 °C) 82 16 (!) 193/102 98 % 18 0018 95.7 °F (35.4 °C) 89 18 (!) 212/113 100 % 18 2008 97.9 °F (36.6 °C) 84 20 (!) 175/107 99 % 18 1608 97.9 °F (36.6 °C) 86 18 180/90 98 % 18 1215 97.5 °F (36.4 °C) 80 18 175/90 97 % Oxygen Therapy O2 Sat (%): 98 % (18 0830) Pulse via Oximetry: 84 beats per minute (18 0504) O2 Device: Room air (18 1910) Intake/Output Summary (Last 24 hours) at 2018 1002 Last data filed at 12/3/2018 1143 Gross per 24 hour Intake 1929 ml Output 350 ml Net 1579 ml General:    Well nourished. Alert. CV:   RRR. No murmur, rub, or gallop. Lungs:   Clear to auscultation bilaterally. No wheezing, rhonchi, or rales. Abdomen:   Soft, nontender, nondistended. Extremities: Warm and dry. No cyanosis or edema. Skin:     No rashes or jaundice. Neuro:  No focal deficits Psych:  AOx3 Data Review: 
I have reviewed all labs, meds, telemetry events, and studies from the last 24 hours. Recent Results (from the past 24 hour(s)) GLUCOSE, POC Collection Time: 12/03/18 11:20 AM  
Result Value Ref Range Glucose (POC) 287 (H) 65 - 100 mg/dL GLUCOSE, POC Collection Time: 12/03/18  4:57 PM  
Result Value Ref Range Glucose (POC) 251 (H) 65 - 100 mg/dL GLUCOSE, POC Collection Time: 12/03/18  9:53 PM  
Result Value Ref Range Glucose (POC) 270 (H) 65 - 100 mg/dL METABOLIC PANEL, BASIC Collection Time: 12/04/18  5:22 AM  
Result Value Ref Range Sodium 141 136 - 145 mmol/L Potassium 3.9 3.5 - 5.1 mmol/L Chloride 107 98 - 107 mmol/L  
 CO2 26 21 - 32 mmol/L Anion gap 8 mmol/L Glucose 162 (H) 65 - 100 mg/dL BUN 13 6 - 23 MG/DL Creatinine 0.72 0.6 - 1.0 MG/DL  
 GFR est AA >60 >60 ml/min/1.73m2 GFR est non-AA >60 ml/min/1.73m2 Calcium 8.5 8.3 - 10.4 MG/DL  
CBC WITH AUTOMATED DIFF Collection Time: 12/04/18  5:22 AM  
Result Value Ref Range WBC 6.5 4.3 - 11.1 K/uL  
 RBC 4.37 4.05 - 5.2 M/uL  
 HGB 11.6 (L) 11.7 - 15.4 g/dL HCT 35.7 (L) 35.8 - 46.3 % MCV 81.7 79.6 - 97.8 FL  
 MCH 26.5 26.1 - 32.9 PG  
 MCHC 32.5 31.4 - 35.0 g/dL  
 RDW 13.3 11.9 - 14.6 % PLATELET 573 108 - 206 K/uL MPV 11.0 9.4 - 12.3 FL ABSOLUTE NRBC 0.00 0.0 - 0.2 K/uL  
 DF AUTOMATED NEUTROPHILS 65 43 - 78 % LYMPHOCYTES 28 13 - 44 % MONOCYTES 6 4.0 - 12.0 % EOSINOPHILS 1 0.5 - 7.8 % BASOPHILS 1 0.0 - 2.0 % IMMATURE GRANULOCYTES 0 0.0 - 5.0 %  
 ABS. NEUTROPHILS 4.2 1.7 - 8.2 K/UL  
 ABS. LYMPHOCYTES 1.8 0.5 - 4.6 K/UL  
 ABS. MONOCYTES 0.4 0.1 - 1.3 K/UL  
 ABS. EOSINOPHILS 0.1 0.0 - 0.8 K/UL  
 ABS. BASOPHILS 0.0 0.0 - 0.2 K/UL  
 ABS. IMM. GRANS. 0.0 0.0 - 0.5 K/UL GLUCOSE, POC Collection Time: 12/04/18  7:28 AM  
Result Value Ref Range Glucose (POC) 193 (H) 65 - 100 mg/dL All Micro Results Procedure Component Value Units Date/Time CULTURE, URINE [[id number]]  (Abnormal)  (Susceptibility) Collected:  11/30/18 3138 Order Status:  Completed Specimen:  Urine from Clean catch Updated:  12/03/18 7704 Special Requests: NO SPECIAL REQUESTS Culture result:    
  >100,000 COLONIES/mL KLEBSIELLA PNEUMONIAE Current Meds: 
Current Facility-Administered Medications Medication Dose Route Frequency  insulin lispro (HUMALOG) injection   SubCUTAneous AC&HS  insulin lispro (HUMALOG) injection 5 Units  5 Units SubCUTAneous TIDAC  cephALEXin (KEFLEX) capsule 500 mg  500 mg Oral Q12H  
 insulin glargine (LANTUS) injection 15 Units  15 Units SubCUTAneous QHS  loperamide (IMODIUM) capsule 2 mg  2 mg Oral PRN  
 hydrALAZINE (APRESOLINE) 20 mg/mL injection 20 mg  20 mg IntraVENous Q6H PRN  
 sodium chloride (NS) flush 5-10 mL  5-10 mL IntraVENous Q8H  
 sodium chloride (NS) flush 5-10 mL  5-10 mL IntraVENous PRN  
 acetaminophen (TYLENOL) tablet 650 mg  650 mg Oral Q4H PRN  
 HYDROcodone-acetaminophen (NORCO)  mg tablet 1 Tab  1 Tab Oral Q4H PRN  
 naloxone (NARCAN) injection 0.4 mg  0.4 mg IntraVENous PRN  
 diphenhydrAMINE (BENADRYL) capsule 25 mg  25 mg Oral Q4H PRN  
 bisacodyl (DULCOLAX) tablet 5 mg  5 mg Oral DAILY PRN  
 LORazepam (ATIVAN) tablet 1 mg  1 mg Oral BID PRN  
 enoxaparin (LOVENOX) injection 40 mg  40 mg SubCUTAneous Q24H  
 dextrose 40% (GLUTOSE) oral gel 1 Tube  15 g Oral PRN  
 glucagon (GLUCAGEN) injection 1 mg  1 mg IntraMUSCular PRN  
 dextrose (D50W) injection syrg 12.5-25 g  25-50 mL IntraVENous PRN  
 ondansetron (ZOFRAN) injection 4 mg  4 mg IntraVENous Q4H PRN Other Studies (last 24 hours): No results found. Assessment and Plan:  
 
Hospital Problems as of 12/4/2018 Never Reviewed Codes Class Noted - Resolved POA * (Principal) Orthostatic hypotension ICD-10-CM: I95.1 ICD-9-CM: 458.0  12/1/2018 - Present Yes  Uncontrolled type 2 diabetes mellitus with hyperglycemia (HCC) (Chronic) ICD-10-CM: E11.65 
 ICD-9-CM: 250.02  12/1/2018 - Present Yes Overview Signed 12/1/2018  5:22 AM by Chago Turcios MD  
  untreated Syncope Orthostatic Hypotension Suspect dysautonomia secondary to uncontrolled DM. Has received IVF but still with large variations in sitting/standing BPs. Hypertensive at rest with >60 point drop with standing. 
- large variations with high resting BP greatly limiting ability to treat with medication 
- add compression stockings 
- ECHO pending 
- cards consulted for additional recs 
- lisinopril held Uncontrolled DM type 2 Hgb A1c 14. Not being treated prior to admit. 
- increase Lantus to 20 units 
- cont SSI Klebsiella UTI  
- urine culture positive for Klebsiella 
- cont Keflex DC planning/Dispo: Home possibly tomorrow pending further clinical improvement DVT ppx: with Lovenox Signed: 
Tila Archibald MD

## 2018-12-04 NOTE — PROGRESS NOTES
Problem: Self Care Deficits Care Plan (Adult) Goal: *Acute Goals and Plan of Care (Insert Text) 1. Patient will perform grooming with supervision. 2. Patient will perform Upper body dressing with supervision 3. Patient will perform lower body dressing with supervision 4. Patient will perform upper and lower body bathing with supervision. 5. Patient will perform toilet transfers with supervision. 6. Patient will perform shower transfer with supervision. 7. Patient will participate in 30 + minutes of ADL/ therapeutic exercise/therapeutic activity with min rest breaks to increase activity tolerance for self care. 8. Patient will perform ADL functional mobility in room with supervisio. Goals to be achieved in 7 days. OCCUPATIONAL THERAPY: Initial Assessment, Daily Note and Treatment Day: Day of Assessment 12/4/2018INPATIENT: Hospital Day: 5 Payor: [employer] / Plan: [employer] [place] / Product Type: PPO /  
  
NAME/AGE/GENDER: Clare Corrales is a 45 y.o. female PRIMARY DIAGNOSIS:  Orthostatic hypotension [I95.1] Orthostatic hypotension [I95.1] Orthostatic hypotension Orthostatic hypotension ICD-10: Treatment Diagnosis:  
 · Generalized Muscle Weakness (M62.81) Precautions/Allergies: 
   Patient has no known allergies. ASSESSMENT:  
Ms. Dion Yap presents with above diagnosis and was seen in room. Pt noted to have generalized weakness, decreased balance and decreased activity tolerance, all possibly due to BP fluctuation. Pt would benefit from OT to maximize safety and independence with self care and functional mobility. This section established at most recent assessment PROBLEM LIST (Impairments causing functional limitations): 1. Decreased Strength 2. Decreased Balance 3. Decreased Activity Tolerance INTERVENTIONS PLANNED: (Benefits and precautions of occupational therapy have been discussed with the patient.) 1. Activities of daily living training 2. Adaptive equipment training 3. Balance training 4. Therapeutic activity 5. Therapeutic exercise TREATMENT PLAN: Frequency/Duration: Follow patient 3 times per week to address above goals. Rehabilitation Potential For Stated Goals: Good RECOMMENDED REHABILITATION/EQUIPMENT: (at time of discharge pending progress): Due to the probability of continued deficits (see above) this patient will not likely need continued skilled occupational therapy after discharge. Equipment:  
? None at this time OCCUPATIONAL PROFILE AND HISTORY:  
History of Present Injury/Illness (Reason for Referral): 
Weakness Past Medical History/Comorbidities: Ms. Lyly Huertas  has a past medical history of Diabetes ([facility] [address] Ms. Lyly Huertas  has no past surgical history on file. Social History/Living Environment:  
Home Environment: Private residence One/Two Story Residence: One story Living Alone: Yes Support Systems: [employer] / [employer] community, Family member(s), Friends \ neighbors Patient Expects to be Discharged to[de-identified] Private residence Current DME Used/Available at Home: [address] [facility], [place], quad, [employer] Tub or Shower Type: Tub/Shower combination Prior Level of Function/Work/Activity: 
Independent lives alone Number of Personal Factors/Comorbidities that affect the Plan of Care: Brief history (0):  LOW COMPLEXITY ASSESSMENT OF OCCUPATIONAL PERFORMANCE[de-identified]  
Activities of Daily Living:  
Basic ADLs (From Assessment) Complex ADLs (From Assessment) Feeding: Independent Oral Facial Hygiene/Grooming: Supervision(seated) Bathing: Stand-by assistance Upper Body Dressing: Stand-by assistance Lower Body Dressing: Contact guard assistance Toileting: Stand by assistance Grooming/Bathing/Dressing Activities of Daily Living Cognitive Retraining Safety/Judgement: Awareness of environment Functional Transfers Bathroom Mobility: Contact guard assistance Toilet Transfer : Contact guard assistance Shower Transfer: Contact guard assistance Bed/Mat Mobility Supine to Sit: Stand-by assistance Sit to Supine: Stand-by assistance Sit to Stand: Contact guard assistance Bed to Chair: Contact guard assistance Most Recent Physical Functioning:  
Gross Assessment: 
  
         
  
Posture: 
Posture (WDL): Within defined limits Balance: 
Sitting: Intact Standing: Pull to stand; With support Bed Mobility: 
Supine to Sit: Stand-by assistance Sit to Supine: Stand-by assistance Wheelchair Mobility: 
  
Transfers: 
Sit to Stand: Contact guard assistance Stand to Sit: Contact guard assistance Bed to Chair: Contact guard assistance Patient Vitals for the past 6 hrs: 
 BP BP Patient Position SpO2 Pulse 12/04/18 0830 (!) 158/117 At rest 98 % 83 Mental Status Neurologic State: Alert Orientation Level: Oriented X4 Cognition: Appropriate decision making Perception: Appears intact Perseveration: No perseveration noted Safety/Judgement: Awareness of environment Physical Skills Involved: 
1. Balance 2. Strength 3. Activity Tolerance Cognitive Skills Affected (resulting in the inability to perform in a timely and safe manner): 1. none Psychosocial Skills Affected: 1. Environmental Adaptation Number of elements that affect the Plan of Care: 3-5:  MODERATE COMPLEXITY CLINICAL DECISION MAKING:  
[employer] AM-PAC 6 Clicks Daily Activity Inpatient Short Form How much help from another person does the patient currently need. .. Total A Lot A Little None 1. Putting on and taking off regular lower body clothing? [] 1   [] 2   [x] 3   [] 4  
2. Bathing (including washing, rinsing, drying)? [] 1   [] 2   [x] 3   [] 4  
3. Toileting, which includes using toilet, bedpan or urinal?   [] 1   [] 2   [x] 3   [] 4  
4. Putting on and taking off regular upper body clothing? [] 1   [] 2   [] 3   [x] 4 5. Taking care of personal grooming such as brushing teeth? [] 1   [] 2   [] 3   [x] 4  
6. Eating meals? [] 1   [] 2   [] 3   [x] 4  
© 2007, Trustees of [address] [place] [address] [address] [address], under license to [facility]. All rights reserved Score:  Initial: 21 Most Recent: X (Date: -- ) Interpretation of Tool:  Represents activities that are increasingly more difficult (i.e. Bed mobility, Transfers, Gait). Score 24 23 22-20 19-15 14-10 9-7 6 Modifier CH CI CJ CK CL CM CN   
 
? Self Care:  
  - CURRENT STATUS: CJ - 20%-39% impaired, limited or restricted  - GOAL STATUS: CI - 1%-19% impaired, limited or restricted  - D/C STATUS:  ---------------To be determined--------------- Payor: Avel Whitt / Plan: [facility] / Product Type: PPO /   
 
Medical Necessity:    
· Patient is expected to demonstrate progress in strength, balance and functional technique to increase independence with self care. Reason for Services/Other Comments: 
· Patient would benefit from OT to maximize safety and independence with self care and functional mobility. .  
Use of outcome tool(s) and clinical judgement create a POC that gives a: LOW COMPLEXITY  
 
 
 
TREATMENT:  
(In addition to Assessment/Re-Assessment sessions the following treatments were rendered) Pre-treatment Symptoms/Complaints:   
Pain: Initial:  
Pain Intensity 1: 0  Post Session: 0 Self Care: (15min): Procedure(s) (per grid) utilized to improve and/or restore self-care/home management as related to toileting and grooming. Required minimal verbal and   cueing to facilitate activities of daily living skills and compensatory activities. Braces/Orthotics/Lines/Etc:  
· O2 Device: Room air Treatment/Session Assessment:   
· Response to Treatment:  Pt up to bathroom and toileted and groomed tolerated fair · Interdisciplinary Collaboration:  
o Physical Therapist 
o Occupational Therapist 
o Registered Nurse · After treatment position/precautions:  
o Supine in bed 
o Bed/Chair-wheels locked 
o Bed in low position 
o Call light within reach · Compliance with Program/Exercises: Compliant all of the time. · Recommendations/Intent for next treatment session: \"Next visit will focus on advancements to more challenging activities and reduction in assistance provided\". Total Treatment Duration: OT Patient Time In/Time Out Time In: 1100 Time Out: 1125 Minerva Canas OT

## 2018-12-04 NOTE — PROGRESS NOTES
Problem: Interdisciplinary Rounds Goal: Interdisciplinary Rounds Interdisciplinary team rounds were held 12/4/2018 with the following team members:Care Management, Nursing, Nutrition, Pharmacy, Physical Therapy and Physician. Plan of care discussed. See clinical pathway and/or care plan for interventions and desired outcomes.

## 2018-12-04 NOTE — PROGRESS NOTES
Notified charge RN of MEWS score of 3 related to blood pressure. Supine /113, sitting /107, standing /97. Pt is in no distress with no c/o dizziness

## 2018-12-04 NOTE — PROGRESS NOTES
Pt in bed getting ready to eat breakfast. Alert & oriented; resp even & unlabored on room air; no acute signs of distress noted. Bed low & locked; call light in reach; no needs voiced; will continue to monitor.

## 2018-12-04 NOTE — PROGRESS NOTES
Shift assessment completed. Pt alert and oriented x4. Lungs CTA with even and unlabored respirations. Heart sounds regular. Active bowel sounds with soft and non-tender abdomen. Skin warm and dry. IV c/d and capped. Pt requests diet ginger ale and a sandwich tray. No pain or other needs at this time. Bed locked in lowest position with call light in reach

## 2018-12-05 LAB
GLUCOSE BLD STRIP.AUTO-MCNC: 164 MG/DL (ref 65–100)
GLUCOSE BLD STRIP.AUTO-MCNC: 174 MG/DL (ref 65–100)
GLUCOSE BLD STRIP.AUTO-MCNC: 178 MG/DL (ref 65–100)
GLUCOSE BLD STRIP.AUTO-MCNC: 191 MG/DL (ref 65–100)
GLUCOSE BLD STRIP.AUTO-MCNC: 280 MG/DL (ref 65–100)
GLUCOSE BLD STRIP.AUTO-MCNC: 310 MG/DL (ref 65–100)
PROT UR-MCNC: 170 MG/DL

## 2018-12-05 PROCEDURE — 74011250636 HC RX REV CODE- 250/636: Performed by: FAMILY MEDICINE

## 2018-12-05 PROCEDURE — 77030032490 HC SLV COMPR SCD KNE COVD -B

## 2018-12-05 PROCEDURE — 97530 THERAPEUTIC ACTIVITIES: CPT

## 2018-12-05 PROCEDURE — 84156 ASSAY OF PROTEIN URINE: CPT

## 2018-12-05 PROCEDURE — 74011636637 HC RX REV CODE- 636/637: Performed by: INTERNAL MEDICINE

## 2018-12-05 PROCEDURE — 82962 GLUCOSE BLOOD TEST: CPT

## 2018-12-05 PROCEDURE — 65660000000 HC RM CCU STEPDOWN

## 2018-12-05 PROCEDURE — 74011250637 HC RX REV CODE- 250/637: Performed by: INTERNAL MEDICINE

## 2018-12-05 PROCEDURE — 74011636637 HC RX REV CODE- 636/637: Performed by: HOSPITALIST

## 2018-12-05 RX ORDER — HYDRALAZINE HYDROCHLORIDE 20 MG/ML
10 INJECTION INTRAMUSCULAR; INTRAVENOUS
Status: DISCONTINUED | OUTPATIENT
Start: 2018-12-05 | End: 2018-12-05

## 2018-12-05 RX ORDER — METOPROLOL TARTRATE 5 MG/5ML
1.25 INJECTION INTRAVENOUS
Status: DISCONTINUED | OUTPATIENT
Start: 2018-12-05 | End: 2018-12-06 | Stop reason: HOSPADM

## 2018-12-05 RX ORDER — INSULIN GLARGINE 100 [IU]/ML
24 INJECTION, SOLUTION SUBCUTANEOUS
Status: DISCONTINUED | OUTPATIENT
Start: 2018-12-05 | End: 2018-12-06

## 2018-12-05 RX ADMIN — INSULIN LISPRO 5 UNITS: 100 INJECTION, SOLUTION INTRAVENOUS; SUBCUTANEOUS at 12:31

## 2018-12-05 RX ADMIN — INSULIN LISPRO 5 UNITS: 100 INJECTION, SOLUTION INTRAVENOUS; SUBCUTANEOUS at 07:41

## 2018-12-05 RX ADMIN — INSULIN LISPRO 2 UNITS: 100 INJECTION, SOLUTION INTRAVENOUS; SUBCUTANEOUS at 12:31

## 2018-12-05 RX ADMIN — ENOXAPARIN SODIUM 40 MG: 40 INJECTION, SOLUTION INTRAVENOUS; SUBCUTANEOUS at 08:21

## 2018-12-05 RX ADMIN — INSULIN LISPRO 6 UNITS: 100 INJECTION, SOLUTION INTRAVENOUS; SUBCUTANEOUS at 17:41

## 2018-12-05 RX ADMIN — INSULIN LISPRO 5 UNITS: 100 INJECTION, SOLUTION INTRAVENOUS; SUBCUTANEOUS at 17:42

## 2018-12-05 RX ADMIN — INSULIN LISPRO 8 UNITS: 100 INJECTION, SOLUTION INTRAVENOUS; SUBCUTANEOUS at 22:36

## 2018-12-05 RX ADMIN — CEPHALEXIN 500 MG: 500 CAPSULE ORAL at 08:22

## 2018-12-05 RX ADMIN — INSULIN LISPRO 2 UNITS: 100 INJECTION, SOLUTION INTRAVENOUS; SUBCUTANEOUS at 07:41

## 2018-12-05 RX ADMIN — Medication 10 ML: at 05:54

## 2018-12-05 RX ADMIN — INSULIN GLARGINE 24 UNITS: 100 INJECTION, SOLUTION SUBCUTANEOUS at 22:36

## 2018-12-05 RX ADMIN — HYDRALAZINE HYDROCHLORIDE 20 MG: 20 INJECTION INTRAMUSCULAR; INTRAVENOUS at 08:21

## 2018-12-05 NOTE — PROGRESS NOTES
Patient resting in bed. Respirations present, non-labored. HR regular. Voiding at intervals. PIV intact and patent. Aware to call if needs arise.

## 2018-12-05 NOTE — PROGRESS NOTES
Problem: Patient Education: Go to Patient Education Activity Goal: Patient/Family Education Nutrition Follow Up: 
Received MD consult for Diabetic Diet Education Assessment:  
Diet order(s): Consistent CHO Pt presented with orthostatic hypotension. Past medical history notable for DM2. Pt states she has been enjoying the meals and has had good po intake. No questions r/t diet education provided on 12/04. Requesting a Glucerna Shake for bedtime snack Pertinent Labs:  HgbA1C: 14 with average glucose 355 mg/dl. POC glucose range today:  178-164-174 mg/dl Anthropometrics: Height: 5' 6\" (167.6 cm),  Weight: 58.2 kg (128 lb 3.2 oz), Weight Source: Standing scale (comment), Body mass index is 20.69 kg/m². BMI class of low normal range. Macronutrient Needs: 
· EER:  6747-0693 kcal /day (25-30 kcal/kg BW) · EPR:  58-70 grams protein/day (1-1.2 grams/kg IBW)  GFR >60 
· CHO needs:  182-218 grams CHO/day (50 % of kcal) Intake/Comparative Standards: No recorded intake; pt endorses good po intake 12/04 and breakfast this am.  Pt potentially meets 100% estimated kcal and protein needs.                       
  
Intervention: 
Meals and snacks: Continue current diet. Briefly reviewed CHO counting while assisting patient with menu selection for lunch. Nutrition Supplement:  Initiated Glucerna Shake supplement once daily for HS snack. Education/Discharge nutrition plan:   12/04  Provided patient with written and verbal instruction on a Consistent CHO diet with 4 CHO servings/meal; received \"My Meal Plan\". Discussed importance of eating 3 meals/day; discussed sources of CHO, portion control, food preparation and how to read a food label. Pt verbalized good understanding and anticipate good compliance.  
Coordination of Nutrition Services:  Interdisciplinary Rounds 
  
Osiris Hastings, [address] [place] [address], [address] [place] [address],  
[phone]

## 2018-12-05 NOTE — PROGRESS NOTES
Problem: Falls - Risk of 
Goal: *Absence of Falls Document Tasiakiaramitch Mendoza Fall Risk and appropriate interventions in the flowsheet. Outcome: Progressing Towards Goal 
Fall Risk Interventions: 
Mobility Interventions: Bed/chair exit alarm Medication Interventions: Bed/chair exit alarm Elimination Interventions: Call light in reach

## 2018-12-05 NOTE — DIABETES MGMT
Blood glucose 164. Blood glucose range yesterday 163-237 with patient receiving a total 42 unit sof insulin (Lantus 15 units and Humalog 27 units). Reviewed current insulin regimen: Lantus 24 units hs, Humalog 5 units 3x/day ac and Humalog sliding scale coverage 4x/day ac and hs,, including type of insulin, timing with meals, onset, peak of action and duration of effect. Pt verbalizes understanding. Pt has been practicing insulin self-injection under RN supervision and is comfortable with technique. Discussed target goals for HbA1c and blood glucose. Stressed the importance of follow up care for diabetes management with PCP. Discussed the importance of medication compliance, following  a consistent carbohydrate diet, and monitoring blood glucose qid. Encouraged pt to participate in further diabetes education. Pt given contact information for Bobby Coats to pursue at their convenience. Pt has no further questions at this time. Pt will need prescriptions for insulin pens, pen needles, blood glucose meter, lancets, and blood glucose test strips at discharge.

## 2018-12-05 NOTE — PROGRESS NOTES
Problem: Interdisciplinary Rounds Goal: Interdisciplinary Rounds Interdisciplinary team rounds were held 12/5/2018 with the following team members:Care Management, Nursing, Nutrition, Pharmacy, Physical Therapy and Physician and the patient. Plan of care discussed. See clinical pathway and/or care plan for interventions and desired outcomes.

## 2018-12-05 NOTE — PROGRESS NOTES
Problem: Mobility Impaired (Adult and Pediatric) Goal: *Acute Goals and Plan of Care (Insert Text) STG: 
(1.)Ms. Nikki Saxena will move from supine to sit and sit to supine  with INDEPENDENT within three treatment day(s). (2.)Ms. Nikki Saxena will transfer from bed to chair and chair to bed with INDEPENDENT using the least restrictive device within three treatment day(s). (3.)Ms. Nikki Saxena will ambulate with CGA-SBA for 250 feet with the least restrictive device within three treatment day(s). LTG: 
(1.)Ms. Nikki Saxena will ambulate with INDEPENDENT for 250 feet with the least restrictive device within seven treatment day(s). ________________________________________________________________________________________________ Outcome: Progressing Towards Goal 
 
PHYSICAL THERAPY: Daily Note, PM 12/5/2018INPATIENT: Hospital Day: 6 Payor: BLUE CROSS / [place] BLUE CR[place] [facility] / Product Type: PPO /  
  
NAME/AGE/GENDER: Isidro Bradford is a 45 y.o. female PRIMARY DIAGNOSIS: Orthostatic hypotension [I95.1] Orthostatic hypotension [I95.1] Orthostatic hypotension Orthostatic hypotension ICD-10: Treatment Diagnosis: · Difficulty in walking, Not elsewhere classified (R26.2) · Other abnormalities of gait and mobility (R26.89) Precaution/Allergies: 
Patient has no known allergies. ASSESSMENT:  
Ms. Nikki Saxena is sitting up in chair on arrival.  She is very sleepy on first attempt and RN notified. She requested for therapist to return at a later time. Pt sitting up in chair on return and looked better and more alert. Pt ambulated 650' with CGA/SBA with unsteady gait. Pt left sitting up in chair with needs in reach. This section established at most recent assessment PROBLEM LIST (Impairments causing functional limitations): 1. Decreased Strength 2. Decreased Transfer Abilities 3. Decreased Ambulation Ability/Technique 4. Decreased Balance 5. Increased Pain 6. Decreased Activity Tolerance 7. Decreased Flexibility/Joint Mobility INTERVENTIONS PLANNED: (Benefits and precautions of physical therapy have been discussed with the patient.) 1. Bed Mobility 2. Gait Training 3. Therapeutic Activites 4. Therapeutic Exercise/Strengthening 5. Transfer Training TREATMENT PLAN: Frequency/Duration: daily for duration of hospital stay Rehabilitation Potential For Stated Goals: Good RECOMMENDED REHABILITATION/EQUIPMENT: (at time of discharge pending progress): Due to the probability of continued deficits (see above) this patient will likely need continued skilled physical therapy after discharge. Equipment:  
? None at this time HISTORY:  
History of Present Injury/Illness (Reason for Referral): Pt with decreased mobility, due to above diagnosis. Past Medical History/Comorbidities: Ms. Radha Javier  has a past medical history of Diabetes ([facility] [address] Ms. Radha Javier  has no past surgical history on file. Social History/Living Environment:  
Home Environment: Private residence One/Two Story Residence: One story Living Alone: Yes Support Systems: Muslim / geoff[place] community, Family member(s), Friends \ neighbors Patient Expects to be Discharged to[de-identified] Private residence Current DME Used/Available at Home: [place], quad, Roselinda Eek Tub or Shower Type: Tub/Shower combination Prior Level of Function/Work/Activity: Pt walked with a cane with modified independence with functional mobility. Number of Personal Factors/Comorbidities that affect the Plan of Care: 0: LOW COMPLEXITY EXAMINATION:  
Most Recent Physical Functioning:  
Gross Assessment: 
  
         
  
Posture: 
  
Balance: 
  Bed Mobility: 
  
Wheelchair Mobility: 
  
Transfers: 
Sit to Stand: Contact guard assistance Stand to Sit: Contact guard assistance Bed to Chair: Contact guard assistance Gait: 
  
Distance (ft): 650 Feet (ft) Ambulation - Level of Assistance: Stand-by assistance;Contact guard assistance Body Structures Involved: 1. Bones 2. Joints 3. Muscles 4. Ligaments Body Functions Affected: 1. Neuromusculoskeletal 
2. Movement Related Activities and Participation Affected: 1. Mobility Number of elements that affect the Plan of Care: 4+: HIGH COMPLEXITY CLINICAL PRESENTATION:  
Presentation: Stable and uncomplicated: LOW COMPLEXITY CLINICAL DECISION MAKING:  
[facility] AM-PAC 6 Clicks Basic Mobility Inpatient Short Form How much difficulty does the patient currently have. .. Unable A Lot A Little None 1. Turning over in bed (including adjusting bedclothes, sheets and blankets)? [] 1   [] 2   [x] 3   [] 4  
2. Sitting down on and standing up from a chair with arms ( e.g., wheelchair, bedside commode, etc.)   [] 1   [] 2   [x] 3   [] 4  
3. Moving from lying on back to sitting on the side of the bed? [] 1   [] 2   [x] 3   [] 4 How much help from another person does the patient currently need. .. Total A Lot A Little None 4. Moving to and from a bed to a chair (including a wheelchair)? [] 1   [] 2   [x] 3   [] 4  
5. Need to walk in hospital room? [] 1   [] 2   [x] 3   [] 4  
6. Climbing 3-5 steps with a railing? [] 1   [] 2   [x] 3   [] 4  
© 2007, Trustees of [facility], under license to [employer]. All rights reserved Score:  Initial: 18 Most Recent: X (Date: -- ) Interpretation of Tool:  Represents activities that are increasingly more difficult (i.e. Bed mobility, Transfers, Gait). Score 24 23 22-20 19-15 14-10 9-7 6 Modifier CH CI CJ CK CL CM CN   
 
? Mobility - Walking and Moving Around:  
  - CURRENT STATUS: CK - 40%-59% impaired, limited or restricted  - GOAL STATUS: CK - 40%-59% impaired, limited or restricted  - D/C STATUS:  CK - 40%-59% impaired, limited or restricted Payor: BLUE CROSS / [place] BL[place] [employer] [facility] / Product Type: PPO /   
 
Medical Necessity: · Skilled intervention continues to be required due to decreased mobility ability. Reason for Services/Other Comments: 
· Patient continues to require skilled intervention due to decreased mobility ability. Use of outcome tool(s) and clinical judgement create a POC that gives a: Clear prediction of patient's progress: LOW COMPLEXITY  
  
 
 
 
TREATMENT:  
(In addition to Assessment/Re-Assessment sessions the following treatments were rendered) Pre-treatment Symptoms/Complaints:  
Pain: Initial:  
   Post Session:  No complaints Therapeutic Activity: (    10 minutes): Therapeutic activities including Ambulation on level ground to improve mobility, strength and balance. Required minimal   to promote dynamic balance in standing. Braces/Orthotics/Lines/Etc:  
· O2 Device: Room air Treatment/Session Assessment:   
· Response to Treatment:  Feeling much better. · Interdisciplinary Collaboration:  
o Physical Therapist 
o Registered Nurse · After treatment position/precautions:  
o sitting edge of bed · Compliance with Program/Exercises: Compliant most of the time · Recommendations/Intent for next treatment session: \"Next visit will focus on advancements to more challenging activities and reduction in assistance provided\". Total Treatment Duration: PT Patient Time In/Time Out Time In: 1430 Time Out: 1440 Roxy Ndiaye PTA

## 2018-12-05 NOTE — PROGRESS NOTES
Shift assessment complete. Resting in chair, A&O x4. Respirations present, even, unlabored. Lung sounds clear to auscultation. HR regular, S1&S2 auscultated. IV capped and patent. Denies any pain at this time. Encouraged to call for help when needed. Will continue to monitor throughout the shift.

## 2018-12-06 ENCOUNTER — HOSPITAL ENCOUNTER (OUTPATIENT)
Dept: PHYSICAL THERAPY | Age: 53
End: 2018-12-06

## 2018-12-06 VITALS
RESPIRATION RATE: 12 BRPM | TEMPERATURE: 97.4 F | OXYGEN SATURATION: 98 % | WEIGHT: 128.2 LBS | SYSTOLIC BLOOD PRESSURE: 137 MMHG | HEART RATE: 79 BPM | DIASTOLIC BLOOD PRESSURE: 84 MMHG | HEIGHT: 66 IN | BODY MASS INDEX: 20.6 KG/M2

## 2018-12-06 LAB
GLUCOSE BLD STRIP.AUTO-MCNC: 131 MG/DL (ref 65–100)
GLUCOSE BLD STRIP.AUTO-MCNC: 163 MG/DL (ref 65–100)

## 2018-12-06 PROCEDURE — 74011250637 HC RX REV CODE- 250/637: Performed by: FAMILY MEDICINE

## 2018-12-06 PROCEDURE — 82962 GLUCOSE BLOOD TEST: CPT

## 2018-12-06 PROCEDURE — 74011250636 HC RX REV CODE- 250/636: Performed by: FAMILY MEDICINE

## 2018-12-06 PROCEDURE — 74011636637 HC RX REV CODE- 636/637: Performed by: HOSPITALIST

## 2018-12-06 RX ORDER — INSULIN LISPRO 100 [IU]/ML
6 INJECTION, SOLUTION INTRAVENOUS; SUBCUTANEOUS
Qty: 1 PACKAGE | Refills: 0 | Status: SHIPPED | OUTPATIENT
Start: 2018-12-06

## 2018-12-06 RX ORDER — LANCETS
EACH MISCELLANEOUS
Qty: 1 EACH | Refills: 11 | Status: SHIPPED | OUTPATIENT
Start: 2018-12-06

## 2018-12-06 RX ORDER — INSULIN GLARGINE 100 [IU]/ML
26 INJECTION, SOLUTION SUBCUTANEOUS
Qty: 5 PEN | Refills: 0 | Status: SHIPPED | OUTPATIENT
Start: 2018-12-06

## 2018-12-06 RX ORDER — INSULIN GLARGINE 100 [IU]/ML
26 INJECTION, SOLUTION SUBCUTANEOUS
Status: DISCONTINUED | OUTPATIENT
Start: 2018-12-06 | End: 2018-12-06 | Stop reason: HOSPADM

## 2018-12-06 RX ORDER — GLIPIZIDE 5 MG/1
5 TABLET ORAL 2 TIMES DAILY
Qty: 60 TAB | Refills: 1 | Status: SHIPPED | OUTPATIENT
Start: 2018-12-06

## 2018-12-06 RX ADMIN — ENOXAPARIN SODIUM 40 MG: 40 INJECTION, SOLUTION INTRAVENOUS; SUBCUTANEOUS at 08:23

## 2018-12-06 RX ADMIN — INSULIN LISPRO 5 UNITS: 100 INJECTION, SOLUTION INTRAVENOUS; SUBCUTANEOUS at 08:23

## 2018-12-06 RX ADMIN — ACETAMINOPHEN 650 MG: 325 TABLET, FILM COATED ORAL at 00:14

## 2018-12-06 RX ADMIN — Medication 10 ML: at 05:30

## 2018-12-06 RX ADMIN — INSULIN LISPRO 2 UNITS: 100 INJECTION, SOLUTION INTRAVENOUS; SUBCUTANEOUS at 08:24

## 2018-12-06 RX ADMIN — INSULIN LISPRO 5 UNITS: 100 INJECTION, SOLUTION INTRAVENOUS; SUBCUTANEOUS at 12:13

## 2018-12-06 NOTE — PROGRESS NOTES
A.M assessment complete; pt sitting at the edge of the bed getting ready to eat breakfast. Alert & oriented. resp even & unlabored on room air; lungs clear. HR regular. Abdomen soft with active bowel sounds. No c/o pain. Bed low & locked; call light in reach; no needs voiced; will continue to monitor.

## 2018-12-06 NOTE — DISCHARGE SUMMARY
Hospitalist Discharge Summary     Patient ID:  Saad Dillon  [id number]  64 y.o.  1965  Admit date: 11/30/2018 11:45 PM  Discharge date and time: 12/6/2018  Attending: Jt Godfrey MD  PCP:  Addy Santos MD  Treatment Team: Attending Provider: Jt Godfrey MD; Utilization Review: Jo-Ann Rebolledo RN; Consulting Provider: Jm Solano MD    Principal Diagnosis Orthostatic hypotension   Principal Problem:    Orthostatic hypotension (12/1/2018)    Active Problems:    Uncontrolled type 2 diabetes mellitus with hyperglycemia ([address] (12/1/2018)      Overview: untreated             Hospital Course:  Please refer to the admission H&P for details of presentation. In summary, 46 y/o F with HTN, uncontrolled DM 2 admitted for syncope likely due to orthostatic hypotension, hyperglycemia in the context of uncontrolled DM type 2 and possible UTI. HbA1c 14.      Syncope   Orthostatic Hypotension  Suspect dysautonomia secondary to uncontrolled DM. Has received IVF but continued with large variations in sitting/standing BPs. Hypertensive at rest with >60 point drop with standing. Sitting BPs stable. This has improved significantly, and she is no longer symptomatic with standing.  - large variations with high resting BP greatly limiting ability to treat   - cont compression stockings  - ECHO with preserved EF  - appreciate cards  - lisinopril d/c'd     Uncontrolled DM type 2  Hgb A1c 14. Not being treated prior to admit. Much better controlled.   - d/c on Lantus to 26 units, Humalong 6 units TIDAC  - has met with diabetic educator  - needs close outpt f/u - SW obtaining PCP prior to d/c     Klebsiella UTI   - urine culture positive for Klebsiella  - completed course of keflex      Significant Diagnostic Studies:   All Micro Results     Procedure Component Value Units Date/Time    CULTURE, URINE [[id number]]  (Abnormal)  (Susceptibility) Collected:  11/30/18 5776    Order Status:  Completed Specimen:  Urine from Clean catch Updated:  12/03/18 0727     Special Requests: NO SPECIAL REQUESTS        Culture result:       >100,000 COLONIES/mL KLEBSIELLA PNEUMONIAE              CT HEAD WO CONT   Final Result   IMPRESSION:  No acute intracranial process. XR CHEST PA LAT   Final Result   IMPRESSION:  No acute process. Labs: Results:       Chemistry Recent Labs     12/04/18 0522   *      K 3.9      CO2 26   BUN 13   CREA 0.72   CA 8.5   AGAP 8      CBC w/Diff Recent Labs     12/04/18 0522   WBC 6.5   RBC 4.37   HGB 11.6*   HCT 35.7*      GRANS 65   LYMPH 28   EOS 1      Cardiac Enzymes No results for input(s): CPK, CKND1, OTTO in the last 72 hours. No lab exists for component: CKRMB, TROIP   Coagulation No results for input(s): PTP, INR, APTT in the last 72 hours. No lab exists for component: INREXT    Lipid Panel No results found for: CHOL, CHOLPOCT, CHOLX, CHLST, CHOLV, 318303, HDL, LDL, LDLC, DLDLP, 325125, VLDLC, VLDL, TGLX, TRIGL, TRIGP, TGLPOCT, CHHD, CHHDX   BNP No results for input(s): BNPP in the last 72 hours. Liver Enzymes No results for input(s): TP, ALB, TBIL, AP, SGOT, GPT in the last 72 hours. No lab exists for component: DBIL   Thyroid Studies No results found for: T4, T3U, TSH, TSHEXT         Discharge Exam:  Visit Vitals  /84 (BP 1 Location: Left arm)   Pulse 79   Temp 97.4 °F (36.3 °C)   Resp 12   Ht 5' 6\" (1.676 m)   Wt 58.2 kg (128 lb 3.2 oz)   SpO2 98%   BMI 20.69 kg/m²     General appearance: alert, cooperative, no distress, appears stated age  Lungs: clear to auscultation bilaterally  Heart: regular rate and rhythm, S1, S2 normal, no murmur, click, rub or gallop  Abdomen: soft, non-tender.  Bowel sounds normal. No masses,  no organomegaly  Extremities: no cyanosis or edema  Neurologic: Grossly normal    Disposition: home  Discharge Condition: stable  Patient Instructions:   Current Discharge Medication List START taking these medications    Details   glipiZIDE (GLUCOTROL) 5 mg tablet Take 1 Tab by mouth two (2) times a day. Qty: 60 Tab, Refills: 1      insulin glargine (LANTUS,BASAGLAR) 100 unit/mL (3 mL) inpn 26 Units by SubCUTAneous route nightly. Qty: 5 Pen, Refills: 0      insulin lispro (HUMALOG) 100 unit/mL kwikpen 6 Units by SubCUTAneous route Before breakfast, lunch, and dinner. Qty: 1 Package, Refills: 0      glucose blood VI test strips (ASCENSIA AUTODISC VI, ONE TOUCH ULTRA TEST VI) strip QID testing  Qty: 100 Strip, Refills: 2      Lancets misc QID testing  Qty: 1 Each, Refills: 11      Insulin Needles, Disposable, 30 gauge x 1/3\" by SubCUTAneous route four (4) times daily. Qty: 1 Package, Refills: 11         CONTINUE these medications which have NOT CHANGED    Details   ibuprofen (MOTRIN IB) 200 mg tablet Take  by mouth.          STOP taking these medications       lisinopril (PRINIVIL, ZESTRIL) 20 mg tablet Comments:   Reason for Stopping:               Activity: Activity as tolerated  Diet: Diabetic Diet  Wound Care: None needed    Follow-up with PCP in 3-5 days  ·     Time spent to discharge patient 39 minutes  Signed:  Rupa Posadas MD  12/6/2018  9:28 AM

## 2018-12-06 NOTE — PROGRESS NOTES
LM with Arden Sahu on 12/5, no response at this point. I called a few PCP, have a appt for pt at [address] [place] [address] on 12/18 @11am, given info and address to pt.

## 2018-12-06 NOTE — PROGRESS NOTES
Received sleeping. Respirations are even and unlabored. HR is regular. Abdomen is soft. No c/o pain this time. Bed in low position. Call light within reach.

## 2018-12-06 NOTE — PROGRESS NOTES
Discharge instructions reviewed with patient. Opportunity for questions given. Pt states she does not have PCP, pt given a list of PCPs in the area. Pt is a diabetic so she would like to leave after eating lunch.

## 2018-12-06 NOTE — PROGRESS NOTES
Family at bedside. Respirations present, non-labored. No signs of distress noted. Aware to call if needs arise.

## 2018-12-10 NOTE — ADT AUTH CERT NOTES
Syncope - Care Day 5 (12/5/2018) by Brittani Ga RN Review Entered Review Status 12/5/2018 14:32 Completed Criteria Review Care Day: 5 Care Date: 12/5/2018 Level of Care: Inpatient Floor Guideline Day 2 Clinical Status ( ) * Hemodynamic stability 12/5/2018 2:32 PM EST by Michelle Rios  
  Vitals- 97.9, 75, 18, 80/55, 178/102, 95%  
  
(X) * No acute cardiac or neurologic events 12/5/2018 2:32 PM EST by Michelle Rios  
  None noted  
  
(X) * Mental status at baseline 12/5/2018 2:32 PM EST by Michelle Rios  
  A & O x 3  
  
(X) * Dangerous arrhythmia absent 12/5/2018 2:32 PM EST by Michelle Rios  
  none noted- RRR  
  
(X) * No further syncope 12/5/2018 2:32 PM EST by [place] none noted ( ) * No evidence of etiology requiring ongoing inpatient care or monitoring (eg, unstable cardiac rhythm or conduction defect) ( ) * Discharge plans and education understood Activity  
(X) * Ambulatory 12/5/2018 2:32 PM EST by Michelle Rios  
  yes Routes  
(X) * Oral hydration, medications, and diet 12/5/2018 2:32 PM EST by Michelle Rios  
  keflex 500mg po q 12hr, diabetic diet Interventions (X) Possible cardiac monitoring 12/5/2018 2:32 PM EST by Michelle Rios  
  + cardiac monitoring 12/5/2018 2:32 PM EST by Michelle Rios Subject: Additional Clinical Information [id number]:Pt reports she is feeling better this AM. Has been up and ambulating without dizziness. SBP 80s this AM then up to 170s within an hour. Appetite is good. Needs new PCP. No labs for this care dateMeds- lovenox 40mg sc qd, ss insulin, humalog 5u sc tid, lantus 24 u sc qd, hydralazine 20mg iv q 6hr prn x 1Plan- scds, pt/ot, consult Diabetes MngmntIM PLAN:Syncope Orthostatic HypotensionSuspect dysautonomia secondary to uncontrolled DM.  Has received IVF but still with large variations in sitting/standing BPs. Hypertensive at rest with >60 point drop with standing.- large variations with high resting BP greatly limiting ability to treat - cont compression stockings- ECHO with preserved EF- appreciate cards- lisinopril held  Uncontrolled DM type 2Hgb A1c 14. Not being treated prior to admit. Much better controlled. - increase Lantus to 24 units- cont SSI  Klebsiella UTI - urine culture positive for Klebsiella- completed course of keflex    DC planning/Dispo: Home possibly tomorrow pending further clinical improvement, needs PCP * Milestone Letter of Status Determination by Alan Quintero RN Review Entered Review Status 12/3/2018 13:47 In Primary Criteria Review   
  
 Letter of Determination: Upgrade from Observation to Inpatient Status 
  This patient was originally hospitalized as Outpatient Status with Observation Services on 12/1/2018 for orthostatic hypotension.  This patient now meets for Inpatient Admission based on medical necessity.  The patient's stay was medically necessary based on glucose of 539 mg/dl.   
  
It is our recommendation that this patient's hospitalization status should be upgraded from OBSERVATION to INPATIENT status.  
  
The final decision regarding the patient's hospitalization status depends on the attending physician's judgement. 
  
Clyde Kapoor MD, MONE, Physician Advisor [phone].

## 2019-08-19 ENCOUNTER — HOSPITAL ENCOUNTER (OUTPATIENT)
Dept: LAB | Age: 54
Discharge: HOME | End: 2019-08-19

## 2019-08-19 LAB
APPEARANCE UR: ABNORMAL
BACTERIA URNS QL MICRO: ABNORMAL /HPF
BILIRUB UR QL: NEGATIVE
COLOR UR: YELLOW
GLUCOSE UR STRIP.AUTO-MCNC: NEGATIVE MG/DL
HGB UR QL STRIP: ABNORMAL
KETONES UR QL STRIP.AUTO: NEGATIVE MG/DL
LEUKOCYTE ESTERASE UR QL STRIP.AUTO: ABNORMAL
NITRITE UR QL STRIP.AUTO: POSITIVE
PH UR STRIP: 6 [PH] (ref 5–9)
PROT UR STRIP-MCNC: 30 MG/DL
RBC #/AREA URNS HPF: ABNORMAL /HPF
SP GR UR REFRACTOMETRY: 1.01 (ref 1–1.02)
UROBILINOGEN UR QL STRIP.AUTO: 0.2 EU/DL (ref 0.2–1)
WBC URNS QL MICRO: >100 /HPF

## 2019-08-19 PROCEDURE — 81001 URINALYSIS AUTO W/SCOPE: CPT

## 2020-01-15 ENCOUNTER — HOSPITAL ENCOUNTER (OUTPATIENT)
Dept: LAB | Age: 55
Discharge: HOME | End: 2020-01-15

## 2020-01-15 LAB
BASOPHILS # BLD: 0 K/UL (ref 0–0.2)
BASOPHILS NFR BLD: 0 % (ref 0–2)
DIFFERENTIAL METHOD BLD: ABNORMAL
EOSINOPHIL # BLD: 0.2 K/UL (ref 0–0.8)
EOSINOPHIL NFR BLD: 2 % (ref 0.5–7.8)
ERYTHROCYTE [DISTWIDTH] IN BLOOD BY AUTOMATED COUNT: 14.3 % (ref 11.9–14.6)
HCT VFR BLD AUTO: 28.6 % (ref 35.8–46.3)
HGB BLD-MCNC: 8.6 G/DL (ref 11.7–15.4)
IMM GRANULOCYTES # BLD AUTO: 0 K/UL (ref 0–0.5)
IMM GRANULOCYTES NFR BLD AUTO: 0 % (ref 0–5)
LYMPHOCYTES # BLD: 1.3 K/UL (ref 0.5–4.6)
LYMPHOCYTES NFR BLD: 18 % (ref 13–44)
MCH RBC QN AUTO: 26.3 PG (ref 26.1–32.9)
MCHC RBC AUTO-ENTMCNC: 30.1 G/DL (ref 31.4–35)
MCV RBC AUTO: 87.5 FL (ref 79.6–97.8)
MONOCYTES # BLD: 0.3 K/UL (ref 0.1–1.3)
MONOCYTES NFR BLD: 4 % (ref 4–12)
NEUTS SEG # BLD: 5.4 K/UL (ref 1.7–8.2)
NEUTS SEG NFR BLD: 75 % (ref 43–78)
NRBC # BLD: 0 K/UL (ref 0–0.2)
PLATELET # BLD AUTO: 321 K/UL (ref 150–450)
PMV BLD AUTO: 10.9 FL (ref 9.4–12.3)
RBC # BLD AUTO: 3.27 M/UL (ref 4.05–5.2)
WBC # BLD AUTO: 7.2 K/UL (ref 4.3–11.1)

## 2020-01-15 PROCEDURE — 85025 COMPLETE CBC W/AUTO DIFF WBC: CPT
